# Patient Record
Sex: FEMALE | Race: WHITE | NOT HISPANIC OR LATINO | URBAN - METROPOLITAN AREA
[De-identification: names, ages, dates, MRNs, and addresses within clinical notes are randomized per-mention and may not be internally consistent; named-entity substitution may affect disease eponyms.]

---

## 2017-01-15 ENCOUNTER — HOME CARE/HOSPICE - HEALTHEAST (OUTPATIENT)
Dept: HOME HEALTH SERVICES | Facility: HOME HEALTH | Age: 82
End: 2017-01-15

## 2020-01-01 ENCOUNTER — OFFICE VISIT - HEALTHEAST (OUTPATIENT)
Dept: GERIATRICS | Facility: CLINIC | Age: 85
End: 2020-01-01

## 2020-01-01 ENCOUNTER — AMBULATORY - HEALTHEAST (OUTPATIENT)
Dept: ADMINISTRATIVE | Facility: CLINIC | Age: 85
End: 2020-01-01

## 2020-01-01 ENCOUNTER — AMBULATORY - HEALTHEAST (OUTPATIENT)
Dept: GERIATRICS | Facility: CLINIC | Age: 85
End: 2020-01-01

## 2020-01-01 ENCOUNTER — OFFICE VISIT - HEALTHEAST (OUTPATIENT)
Dept: NEUROSURGERY | Facility: CLINIC | Age: 85
End: 2020-01-01

## 2020-01-01 ENCOUNTER — COMMUNICATION - HEALTHEAST (OUTPATIENT)
Dept: GERIATRICS | Facility: CLINIC | Age: 85
End: 2020-01-01

## 2020-01-01 DIAGNOSIS — R52 PAIN MANAGEMENT: ICD-10-CM

## 2020-01-01 DIAGNOSIS — S22.051D CLOSED STABLE BURST FRACTURE OF SIXTH THORACIC VERTEBRA WITH ROUTINE HEALING, SUBSEQUENT ENCOUNTER: ICD-10-CM

## 2020-01-01 DIAGNOSIS — E43 SEVERE PROTEIN-CALORIE MALNUTRITION (H): ICD-10-CM

## 2020-01-01 DIAGNOSIS — G30.9 ALZHEIMER'S DEMENTIA WITHOUT BEHAVIORAL DISTURBANCE, UNSPECIFIED TIMING OF DEMENTIA ONSET: ICD-10-CM

## 2020-01-01 DIAGNOSIS — F02.80 ALZHEIMER'S DEMENTIA WITHOUT BEHAVIORAL DISTURBANCE, UNSPECIFIED TIMING OF DEMENTIA ONSET: ICD-10-CM

## 2020-01-01 DIAGNOSIS — M54.6 ACUTE MIDLINE THORACIC BACK PAIN: ICD-10-CM

## 2020-01-01 DIAGNOSIS — S22.000A COMPRESSION FRACTURE OF BODY OF THORACIC VERTEBRA (H): ICD-10-CM

## 2020-01-01 DIAGNOSIS — M84.48XA PATHOLOGICAL FRACTURE OF THORACIC VERTEBRA, INITIAL ENCOUNTER: ICD-10-CM

## 2020-01-01 DIAGNOSIS — S22.000A THORACIC COMPRESSION FRACTURE, CLOSED, INITIAL ENCOUNTER (H): ICD-10-CM

## 2020-01-01 DIAGNOSIS — G93.41 METABOLIC ENCEPHALOPATHY: ICD-10-CM

## 2020-01-01 RX ORDER — MIRTAZAPINE 7.5 MG/1
7.5 TABLET, FILM COATED ORAL AT BEDTIME
Status: SHIPPED | COMMUNITY
Start: 2020-01-01

## 2020-01-01 RX ORDER — ACETAMINOPHEN 325 MG/1
650 TABLET ORAL 3 TIMES DAILY
Status: SHIPPED | COMMUNITY
Start: 2020-01-01

## 2020-01-28 ENCOUNTER — AMBULATORY - HEALTHEAST (OUTPATIENT)
Dept: OTHER | Facility: CLINIC | Age: 85
End: 2020-01-28

## 2020-01-29 ENCOUNTER — COMMUNICATION - HEALTHEAST (OUTPATIENT)
Dept: NEUROSURGERY | Facility: CLINIC | Age: 85
End: 2020-01-29

## 2020-01-29 DIAGNOSIS — S22.001A CLOSED BURST FRACTURE OF THORACIC VERTEBRA, INITIAL ENCOUNTER (H): ICD-10-CM

## 2020-02-03 ENCOUNTER — OFFICE VISIT - HEALTHEAST (OUTPATIENT)
Dept: GERIATRICS | Facility: CLINIC | Age: 85
End: 2020-02-03

## 2020-02-03 DIAGNOSIS — E43 SEVERE PROTEIN-CALORIE MALNUTRITION (H): ICD-10-CM

## 2020-02-03 DIAGNOSIS — S22.061S: ICD-10-CM

## 2020-02-03 DIAGNOSIS — S22.051D CLOSED STABLE BURST FRACTURE OF SIXTH THORACIC VERTEBRA WITH ROUTINE HEALING, SUBSEQUENT ENCOUNTER: ICD-10-CM

## 2020-02-04 ENCOUNTER — RECORDS - HEALTHEAST (OUTPATIENT)
Dept: LAB | Facility: CLINIC | Age: 85
End: 2020-02-04

## 2020-02-04 LAB — 25(OH)D3 SERPL-MCNC: 43.2 NG/ML (ref 30–80)

## 2020-02-05 ENCOUNTER — OFFICE VISIT - HEALTHEAST (OUTPATIENT)
Dept: GERIATRICS | Facility: CLINIC | Age: 85
End: 2020-02-05

## 2020-02-05 DIAGNOSIS — Z71.89 ENCOUNTER FOR MEDICATION REVIEW AND COUNSELING: ICD-10-CM

## 2020-02-06 ENCOUNTER — RECORDS - HEALTHEAST (OUTPATIENT)
Dept: LAB | Facility: CLINIC | Age: 85
End: 2020-02-06

## 2020-02-06 ENCOUNTER — OFFICE VISIT - HEALTHEAST (OUTPATIENT)
Dept: GERIATRICS | Facility: CLINIC | Age: 85
End: 2020-02-06

## 2020-02-06 DIAGNOSIS — G30.9 ALZHEIMER'S DEMENTIA WITHOUT BEHAVIORAL DISTURBANCE, UNSPECIFIED TIMING OF DEMENTIA ONSET: ICD-10-CM

## 2020-02-06 DIAGNOSIS — S22.051D CLOSED STABLE BURST FRACTURE OF SIXTH THORACIC VERTEBRA WITH ROUTINE HEALING, SUBSEQUENT ENCOUNTER: ICD-10-CM

## 2020-02-06 DIAGNOSIS — E55.9 VITAMIN D INSUFFICIENCY: ICD-10-CM

## 2020-02-06 DIAGNOSIS — G47.00 INSOMNIA, UNSPECIFIED TYPE: ICD-10-CM

## 2020-02-06 DIAGNOSIS — F02.80 ALZHEIMER'S DEMENTIA WITHOUT BEHAVIORAL DISTURBANCE, UNSPECIFIED TIMING OF DEMENTIA ONSET: ICD-10-CM

## 2020-02-06 LAB
ANION GAP SERPL CALCULATED.3IONS-SCNC: 5 MMOL/L (ref 5–18)
BUN SERPL-MCNC: 11 MG/DL (ref 8–28)
CALCIUM SERPL-MCNC: 8.9 MG/DL (ref 8.5–10.5)
CHLORIDE BLD-SCNC: 100 MMOL/L (ref 98–107)
CO2 SERPL-SCNC: 33 MMOL/L (ref 22–31)
CREAT SERPL-MCNC: 0.46 MG/DL (ref 0.6–1.1)
ERYTHROCYTE [DISTWIDTH] IN BLOOD BY AUTOMATED COUNT: 14.4 % (ref 11–14.5)
GFR SERPL CREATININE-BSD FRML MDRD: >60 ML/MIN/1.73M2
GLUCOSE BLD-MCNC: 82 MG/DL (ref 70–125)
HCT VFR BLD AUTO: 35.3 % (ref 35–47)
HGB BLD-MCNC: 11.4 G/DL (ref 12–16)
MCH RBC QN AUTO: 32 PG (ref 27–34)
MCHC RBC AUTO-ENTMCNC: 32.3 G/DL (ref 32–36)
MCV RBC AUTO: 99 FL (ref 80–100)
PLATELET # BLD AUTO: 338 THOU/UL (ref 140–440)
PMV BLD AUTO: 8.8 FL (ref 8.5–12.5)
POTASSIUM BLD-SCNC: 3.9 MMOL/L (ref 3.5–5)
RBC # BLD AUTO: 3.56 MILL/UL (ref 3.8–5.4)
SODIUM SERPL-SCNC: 138 MMOL/L (ref 136–145)
WBC: 7.3 THOU/UL (ref 4–11)

## 2020-02-10 ENCOUNTER — OFFICE VISIT - HEALTHEAST (OUTPATIENT)
Dept: GERIATRICS | Facility: CLINIC | Age: 85
End: 2020-02-10

## 2020-02-10 ENCOUNTER — RECORDS - HEALTHEAST (OUTPATIENT)
Dept: LAB | Facility: CLINIC | Age: 85
End: 2020-02-10

## 2020-02-10 DIAGNOSIS — M54.6 ACUTE MIDLINE THORACIC BACK PAIN: ICD-10-CM

## 2020-02-10 DIAGNOSIS — R52 PAIN MANAGEMENT: ICD-10-CM

## 2020-02-10 DIAGNOSIS — S22.061S: ICD-10-CM

## 2020-02-10 DIAGNOSIS — E46 MALNUTRITION, UNSPECIFIED TYPE (H): ICD-10-CM

## 2020-02-10 DIAGNOSIS — S22.051D CLOSED STABLE BURST FRACTURE OF SIXTH THORACIC VERTEBRA WITH ROUTINE HEALING, SUBSEQUENT ENCOUNTER: ICD-10-CM

## 2020-02-10 LAB
ALBUMIN UR-MCNC: ABNORMAL MG/DL
AMORPH CRY #/AREA URNS HPF: ABNORMAL /[HPF]
APPEARANCE UR: ABNORMAL
BACTERIA #/AREA URNS HPF: ABNORMAL HPF
BILIRUB UR QL STRIP: NEGATIVE
COLOR UR AUTO: YELLOW
GLUCOSE UR STRIP-MCNC: NEGATIVE MG/DL
HGB UR QL STRIP: NEGATIVE
KETONES UR STRIP-MCNC: NEGATIVE MG/DL
LEUKOCYTE ESTERASE UR QL STRIP: NEGATIVE
MUCOUS THREADS #/AREA URNS LPF: ABNORMAL LPF
NITRATE UR QL: NEGATIVE
PH UR STRIP: 7.5 [PH] (ref 4.5–8)
RBC #/AREA URNS AUTO: ABNORMAL HPF
SP GR UR STRIP: 1.02 (ref 1–1.03)
SQUAMOUS #/AREA URNS AUTO: ABNORMAL LPF
UROBILINOGEN UR STRIP-ACNC: ABNORMAL
WBC #/AREA URNS AUTO: ABNORMAL HPF

## 2020-02-11 LAB — BACTERIA SPEC CULT: NO GROWTH

## 2020-02-13 ENCOUNTER — OFFICE VISIT - HEALTHEAST (OUTPATIENT)
Dept: GERIATRICS | Facility: CLINIC | Age: 85
End: 2020-02-13

## 2020-02-13 DIAGNOSIS — E55.9 VITAMIN D INSUFFICIENCY: ICD-10-CM

## 2020-02-13 DIAGNOSIS — G47.00 INSOMNIA, UNSPECIFIED TYPE: ICD-10-CM

## 2020-02-13 DIAGNOSIS — G30.9 ALZHEIMER'S DEMENTIA WITHOUT BEHAVIORAL DISTURBANCE, UNSPECIFIED TIMING OF DEMENTIA ONSET: ICD-10-CM

## 2020-02-13 DIAGNOSIS — S22.051D CLOSED STABLE BURST FRACTURE OF SIXTH THORACIC VERTEBRA WITH ROUTINE HEALING, SUBSEQUENT ENCOUNTER: ICD-10-CM

## 2020-02-13 DIAGNOSIS — F02.80 ALZHEIMER'S DEMENTIA WITHOUT BEHAVIORAL DISTURBANCE, UNSPECIFIED TIMING OF DEMENTIA ONSET: ICD-10-CM

## 2020-02-17 ENCOUNTER — HOSPITAL ENCOUNTER (OUTPATIENT)
Dept: RADIOLOGY | Facility: HOSPITAL | Age: 85
Discharge: HOME | End: 2020-02-17

## 2020-02-17 DIAGNOSIS — S22.001A CLOSED BURST FRACTURE OF THORACIC VERTEBRA, INITIAL ENCOUNTER (H): ICD-10-CM

## 2020-02-18 ENCOUNTER — OFFICE VISIT - HEALTHEAST (OUTPATIENT)
Dept: GERIATRICS | Facility: CLINIC | Age: 85
End: 2020-02-18

## 2020-02-18 DIAGNOSIS — S22.061S: ICD-10-CM

## 2020-02-18 DIAGNOSIS — M54.6 ACUTE MIDLINE THORACIC BACK PAIN: ICD-10-CM

## 2020-02-18 DIAGNOSIS — R52 PAIN MANAGEMENT: ICD-10-CM

## 2020-02-18 DIAGNOSIS — E46 MALNUTRITION, UNSPECIFIED TYPE (H): ICD-10-CM

## 2020-02-18 DIAGNOSIS — S22.051D CLOSED STABLE BURST FRACTURE OF SIXTH THORACIC VERTEBRA WITH ROUTINE HEALING, SUBSEQUENT ENCOUNTER: ICD-10-CM

## 2020-02-20 ENCOUNTER — OFFICE VISIT - HEALTHEAST (OUTPATIENT)
Dept: NEUROSURGERY | Facility: CLINIC | Age: 85
End: 2020-02-20

## 2020-02-20 DIAGNOSIS — M84.48XA PATHOLOGICAL FRACTURE OF THORACIC VERTEBRA, INITIAL ENCOUNTER: ICD-10-CM

## 2020-02-25 ENCOUNTER — AMBULATORY - HEALTHEAST (OUTPATIENT)
Dept: GERIATRICS | Facility: CLINIC | Age: 85
End: 2020-02-25

## 2020-02-25 ENCOUNTER — RECORDS - HEALTHEAST (OUTPATIENT)
Dept: GERIATRICS | Facility: CLINIC | Age: 85
End: 2020-02-25

## 2020-02-25 ENCOUNTER — OFFICE VISIT - HEALTHEAST (OUTPATIENT)
Dept: GERIATRICS | Facility: CLINIC | Age: 85
End: 2020-02-25

## 2020-02-25 DIAGNOSIS — S22.051D CLOSED STABLE BURST FRACTURE OF SIXTH THORACIC VERTEBRA WITH ROUTINE HEALING, SUBSEQUENT ENCOUNTER: ICD-10-CM

## 2020-02-25 DIAGNOSIS — S22.061S: ICD-10-CM

## 2020-02-25 DIAGNOSIS — R64 CACHEXIA (H): ICD-10-CM

## 2020-02-25 DIAGNOSIS — R52 PAIN MANAGEMENT: ICD-10-CM

## 2020-02-25 DIAGNOSIS — M54.6 ACUTE MIDLINE THORACIC BACK PAIN: ICD-10-CM

## 2020-02-25 RX ORDER — GABAPENTIN 100 MG/1
100 CAPSULE ORAL 3 TIMES DAILY
Status: SHIPPED | COMMUNITY
Start: 2020-02-25

## 2020-02-27 ENCOUNTER — COMMUNICATION - HEALTHEAST (OUTPATIENT)
Dept: GERIATRICS | Facility: CLINIC | Age: 85
End: 2020-02-27

## 2020-02-28 ENCOUNTER — OFFICE VISIT - HEALTHEAST (OUTPATIENT)
Dept: GERIATRICS | Facility: CLINIC | Age: 85
End: 2020-02-28

## 2020-02-28 DIAGNOSIS — G30.9 ALZHEIMER'S DEMENTIA WITHOUT BEHAVIORAL DISTURBANCE, UNSPECIFIED TIMING OF DEMENTIA ONSET: ICD-10-CM

## 2020-02-28 DIAGNOSIS — S22.000A COMPRESSION FRACTURE OF BODY OF THORACIC VERTEBRA (H): ICD-10-CM

## 2020-02-28 DIAGNOSIS — F02.80 ALZHEIMER'S DEMENTIA WITHOUT BEHAVIORAL DISTURBANCE, UNSPECIFIED TIMING OF DEMENTIA ONSET: ICD-10-CM

## 2020-02-28 DIAGNOSIS — M54.6 ACUTE MIDLINE THORACIC BACK PAIN: ICD-10-CM

## 2020-03-06 ENCOUNTER — OFFICE VISIT - HEALTHEAST (OUTPATIENT)
Dept: GERIATRICS | Facility: CLINIC | Age: 85
End: 2020-03-06

## 2020-03-06 DIAGNOSIS — S22.000A COMPRESSION FRACTURE OF BODY OF THORACIC VERTEBRA (H): ICD-10-CM

## 2021-01-01 ENCOUNTER — COMMUNICATION - HEALTHEAST (OUTPATIENT)
Dept: GERIATRICS | Facility: CLINIC | Age: 86
End: 2021-01-01

## 2021-01-01 ENCOUNTER — OFFICE VISIT - HEALTHEAST (OUTPATIENT)
Dept: GERIATRICS | Facility: CLINIC | Age: 86
End: 2021-01-01

## 2021-01-01 ENCOUNTER — COMMUNICATION - HEALTHEAST (OUTPATIENT)
Dept: CARE COORDINATION | Facility: HOSPITAL | Age: 86
End: 2021-01-01

## 2021-01-01 ENCOUNTER — AMBULATORY - HEALTHEAST (OUTPATIENT)
Dept: ADMINISTRATIVE | Facility: CLINIC | Age: 86
End: 2021-01-01

## 2021-01-01 DIAGNOSIS — M54.6 ACUTE MIDLINE THORACIC BACK PAIN: ICD-10-CM

## 2021-01-01 DIAGNOSIS — R06.02 SOB (SHORTNESS OF BREATH): ICD-10-CM

## 2021-01-01 DIAGNOSIS — G93.41 METABOLIC ENCEPHALOPATHY: ICD-10-CM

## 2021-01-01 DIAGNOSIS — G30.9 ALZHEIMER'S DEMENTIA WITHOUT BEHAVIORAL DISTURBANCE, UNSPECIFIED TIMING OF DEMENTIA ONSET: ICD-10-CM

## 2021-01-01 DIAGNOSIS — S22.000A THORACIC COMPRESSION FRACTURE, CLOSED, INITIAL ENCOUNTER (H): ICD-10-CM

## 2021-01-01 DIAGNOSIS — R19.7 DIARRHEA, UNSPECIFIED TYPE: ICD-10-CM

## 2021-01-01 DIAGNOSIS — E43 SEVERE PROTEIN-CALORIE MALNUTRITION (H): ICD-10-CM

## 2021-01-01 DIAGNOSIS — S22.000A COMPRESSION FRACTURE OF BODY OF THORACIC VERTEBRA (H): ICD-10-CM

## 2021-01-01 DIAGNOSIS — F02.80 ALZHEIMER'S DEMENTIA WITHOUT BEHAVIORAL DISTURBANCE, UNSPECIFIED TIMING OF DEMENTIA ONSET: ICD-10-CM

## 2021-01-01 RX ORDER — MORPHINE SULFATE 30 MG/1
2.5 TABLET ORAL
Status: SHIPPED | COMMUNITY
Start: 2021-01-01

## 2021-01-01 RX ORDER — LIDOCAINE 50 MG/G
1 PATCH TOPICAL EVERY 24 HOURS
Status: SHIPPED | COMMUNITY
Start: 2021-01-01

## 2021-01-01 ASSESSMENT — MIFFLIN-ST. JEOR
SCORE: 789.17
SCORE: 784.63

## 2021-03-12 ENCOUNTER — COMMUNICATION - HEALTHEAST (OUTPATIENT)
Dept: GERIATRICS | Facility: CLINIC | Age: 86
End: 2021-03-12

## 2021-05-27 VITALS
DIASTOLIC BLOOD PRESSURE: 62 MMHG | SYSTOLIC BLOOD PRESSURE: 120 MMHG | OXYGEN SATURATION: 91 % | HEART RATE: 93 BPM | RESPIRATION RATE: 22 BRPM | TEMPERATURE: 98.2 F

## 2021-05-27 VITALS
SYSTOLIC BLOOD PRESSURE: 115 MMHG | OXYGEN SATURATION: 92 % | HEART RATE: 100 BPM | DIASTOLIC BLOOD PRESSURE: 74 MMHG | RESPIRATION RATE: 18 BRPM

## 2021-06-04 VITALS
HEART RATE: 87 BPM | TEMPERATURE: 97.8 F | DIASTOLIC BLOOD PRESSURE: 66 MMHG | SYSTOLIC BLOOD PRESSURE: 95 MMHG | BODY MASS INDEX: 13.53 KG/M2 | WEIGHT: 78.8 LBS

## 2021-06-04 VITALS
BODY MASS INDEX: 14.66 KG/M2 | DIASTOLIC BLOOD PRESSURE: 67 MMHG | SYSTOLIC BLOOD PRESSURE: 121 MMHG | OXYGEN SATURATION: 92 % | WEIGHT: 85.4 LBS | TEMPERATURE: 98.3 F | HEART RATE: 88 BPM | RESPIRATION RATE: 20 BRPM

## 2021-06-04 VITALS
HEART RATE: 82 BPM | DIASTOLIC BLOOD PRESSURE: 70 MMHG | RESPIRATION RATE: 16 BRPM | SYSTOLIC BLOOD PRESSURE: 129 MMHG | WEIGHT: 88.4 LBS | BODY MASS INDEX: 15.17 KG/M2 | OXYGEN SATURATION: 92 % | TEMPERATURE: 96.5 F

## 2021-06-04 VITALS
WEIGHT: 88.4 LBS | HEART RATE: 99 BPM | SYSTOLIC BLOOD PRESSURE: 154 MMHG | BODY MASS INDEX: 15.17 KG/M2 | RESPIRATION RATE: 18 BRPM | OXYGEN SATURATION: 94 % | TEMPERATURE: 97.4 F | DIASTOLIC BLOOD PRESSURE: 93 MMHG

## 2021-06-04 VITALS
HEART RATE: 87 BPM | BODY MASS INDEX: 15.17 KG/M2 | OXYGEN SATURATION: 90 % | TEMPERATURE: 98.5 F | DIASTOLIC BLOOD PRESSURE: 92 MMHG | SYSTOLIC BLOOD PRESSURE: 149 MMHG | RESPIRATION RATE: 18 BRPM | WEIGHT: 88.4 LBS

## 2021-06-05 VITALS
TEMPERATURE: 97.3 F | OXYGEN SATURATION: 91 % | DIASTOLIC BLOOD PRESSURE: 81 MMHG | HEIGHT: 64 IN | SYSTOLIC BLOOD PRESSURE: 110 MMHG | BODY MASS INDEX: 14.85 KG/M2 | WEIGHT: 87 LBS | RESPIRATION RATE: 20 BRPM | HEART RATE: 85 BPM

## 2021-06-05 VITALS
HEART RATE: 88 BPM | SYSTOLIC BLOOD PRESSURE: 105 MMHG | TEMPERATURE: 98 F | DIASTOLIC BLOOD PRESSURE: 57 MMHG | WEIGHT: 88 LBS | BODY MASS INDEX: 15.11 KG/M2

## 2021-06-05 VITALS
SYSTOLIC BLOOD PRESSURE: 110 MMHG | BODY MASS INDEX: 15.03 KG/M2 | DIASTOLIC BLOOD PRESSURE: 81 MMHG | HEART RATE: 83 BPM | HEIGHT: 64 IN | TEMPERATURE: 98 F | OXYGEN SATURATION: 90 % | WEIGHT: 88 LBS | RESPIRATION RATE: 20 BRPM

## 2021-06-05 VITALS
HEART RATE: 109 BPM | OXYGEN SATURATION: 90 % | DIASTOLIC BLOOD PRESSURE: 42 MMHG | SYSTOLIC BLOOD PRESSURE: 64 MMHG | WEIGHT: 87.8 LBS | RESPIRATION RATE: 18 BRPM | TEMPERATURE: 99 F | BODY MASS INDEX: 15.07 KG/M2

## 2021-06-05 NOTE — PROGRESS NOTES
Patient was seen at [place] [address] for the fitting and delivery of a TLSO.  Patient presents with 3 family member and all were educated on the fit and function of the TLSO.  Patient refused custom and [place] TLSO options quickly.  [employer] TLSO was donned and was fit to the patient.  TLSO was left in the room to be worn when HOB above 30 degrees, sitting, standing, and walking.

## 2021-06-05 NOTE — TELEPHONE ENCOUNTER
PATIENT NAME:  Ade Bob  YOB: 1927  MRN: [id number]  SURGEON: Dr Rodgers  DATE of CONSULT: 1/28/2020  DIAGNOSIS: T6 and T8 burst fractures, no known trauma - no surgery    FOLLOW-UP:  Follow up Visit:  2 wks with xrays in neurosurgery clinic with JOSE   DIAGNOSTICS:standing thoracic xr in brace  DISPOSITION:  Likely TCU

## 2021-06-05 NOTE — PROGRESS NOTES
[facility]      Facility:    [facility] [[id number]]  Code Status: DNR       Chief Complaint/Reason for Visit:  Chief Complaint   Patient presents with     H & P     Admit note to TCU for back pain, thoracic compression fractures.         HPI:   Ade is a 89 y.o. female with hx of dementia, prior compression fractures, presented to the hospital on 1/28/20 with back pain.     From Hospital HPI:  Ade Bob is a 89 y.o. old female with h/o pertinent history of hyperlipdemia, dementia, back pain, alzheimer's disease, and malnutrition who presents to this ED via [facility] with family for evaluation of back pain.  Family present and provides information, her back pain is in the middle of her back, constant, and unchanging. Per family, patient lives at lone but family frequently visits to check on her. No one has noticed any recent fall or trauma and patient denies, but she has dementia.  Patient was in the hospital 3 years ago for fractured T10-L3 vertebrae, but has otherwise been healthy. Shes had severe back pain.     From Hospital DC Summary:  Ade Bob is a 89 y.o. F who lives independently, with PMH of HLD, dementia, back pain, alzheimer's disease, underweight, malnutrition, T10, T12, L3, [id number] fractures for which she was hospitalized 3 years ago, who presents to this ED via [facility] with family for evaluation of back pain.   She was diagnosed with acute superior and inferior endplate compression fractures of T6 with 50% vertebral body height loss, minimal retropulsion into spinal canal and old T8 compression fractures. She was seen by neurosurgery and a [employer] TLSO brace was placed 1/29/2020.  Pain management with scheduled Tylenol, topical lidocaine and low-dose, 2.5 mg every 4 hours as needed oxycodone.  MiraLAX/senna for opioid-induced constipation prophylaxis.  PT/OT evaluated, recommended TCU.      Vitamin D  insufficiency, level low at 22.4 on 17.  No most recent vitamin D level recheck.  On Fosamax.  Added vitamin D and multivitamins.  Check vitamin D level as outpatient.     Primary insomnia-started low-dose 5 mg trazodone.     Moderate protein calorie malnutrition, failure to thrive in adult, underweight, Body mass index is 14.23 kg/m .  Patient was seen by RD.  Commended multivitamins, Magic cup twice a day with meals.     Overall stabilized and discharged to TCU on 20 for PT, OT, nursing cares, medical management and monitoring.      Past Medical History:  Past Medical History:   Diagnosis Date     Alzheimer's dementia     per family report     Back pain 2017     Dementia      Hyperlipidemia            Surgical History:  Past Surgical History:   Procedure Laterality Date     NO PAST SURGERIES         Family History:   Unable to recall secondary to dementia.       Social History:    Social History     Socioeconomic History     Marital status: Single     Spouse name: Not on file     Number of children: Not on file     Years of education: Not on file     Highest education level: Not on file   Occupational History     Not on file   Social Needs     Financial resource strain: Not on file     Food insecurity:     Worry: Not on file     Inability: Not on file     Transportation needs:     Medical: Not on file     Non-medical: Not on file   Tobacco Use     Smoking status: Former Smoker     Packs/day: 1.00     Last attempt to quit: 1977     Years since quittin.1     Smokeless tobacco: Never Used     Tobacco comment: Quit smoking many years ago   Substance and Sexual Activity     Alcohol use: No     Drug use: No     Sexual activity: Not Currently   Lifestyle     Physical activity:     Days per week: Not on file     Minutes per session: Not on file     Stress: Not on file   Relationships     Social connections:     Talks on phone: Not on file     Gets together: Not on file     Attends Congregation  service: Not on file     Active member of club or organization: Not on file     Attends meetings of clubs or organizations: Not on file     Relationship status: Not on file     Intimate partner violence:     Fear of current or ex partner: Not on file     Emotionally abused: Not on file     Physically abused: Not on file     Forced sexual activity: Not on file   Other Topics Concern     Not on file   Social History Narrative     Not on file       Medications:  Current Outpatient Medications   Medication Sig     acetaminophen (TYLENOL) 325 MG tablet Take 2 tablets (650 mg total) by mouth 3 (three) times a day.     alendronate (FOSAMAX) 70 MG tablet Take 70 mg by mouth once a week. Saturdays     aluminum-magnesium hydroxide-simethicone (MAALOX ADVANCED) 200-200-20 mg/5 mL Susp Take 30 mL by mouth every 4 (four) hours as needed.     ergocalciferol (ERGOCALCIFEROL) 1,250 mcg (50,000 unit) capsule Take 1 capsule (50,000 Units total) by mouth once a week.     lidocaine (XYLOCAINE) 5 % ointment Apply to mid back tid     multivitamin therapeutic tablet Take 1 tablet by mouth daily.     oxyCODONE (ROXICODONE) 5 MG immediate release tablet Take 0.5-1 tablets (2.5-5 mg total) by mouth every 4 (four) hours as needed.     polyethylene glycol (MIRALAX) 17 gram packet Take 1 packet (17 g total) by mouth daily.     senna-docusate (PERICOLACE) 8.6-50 mg tablet Take 1 tablet by mouth 2 (two) times a day.     traZODone (DESYREL) 50 MG tablet Take 0.5 tablets (25 mg total) by mouth at bedtime.       Allergies:  Allergies   Allergen Reactions     Cefdinir Nausea Only     Sulfa (Sulfonamide Antibiotics) Unknown and Hives        Review of Systems:  Pertinent items are noted in HPI.      Physical Exam:   General: Patient is alert elderly female, no distress. Thin and frail.   Vitals: /71, Temp 97.8, Pulse 90, RR 18, O2 sat 92%RA.  HEENT: Head is NCAT. Eyes show no injection or icterus. Nares negative. Oropharynx well hydrated.  Neck:  Supple. No tenderness or adenopathy. No JVD.  Lungs: Clear bilaterally. No wheezes.  Cardiovascular: Regular rate and rhythm, normal S1. S2.  Back: No spinal or CVA tenderness.  Abdomen: Soft, no tenderness on exam. Bowel sounds present. No guarding rebound or rigidity.  : Deferred.  Extremities: No edema is noted.  Musculoskeletal: Age related degen changes.   Skin: Warm and dry.   Psych: Mood appears good though she is tired.      Labs:  Lab Results   Component Value Date    WBC 7.3 02/06/2020    HGB 11.4 (L) 02/06/2020    HCT 35.3 02/06/2020    MCV 99 02/06/2020     02/06/2020     Results for orders placed or performed in visit on 02/06/20   Basic Metabolic Panel   Result Value Ref Range    Sodium 138 136 - 145 mmol/L    Potassium 3.9 3.5 - 5.0 mmol/L    Chloride 100 98 - 107 mmol/L    CO2 33 (H) 22 - 31 mmol/L    Anion Gap, Calculation 5 5 - 18 mmol/L    Glucose 82 70 - 125 mg/dL    Calcium 8.9 8.5 - 10.5 mg/dL    BUN 11 8 - 28 mg/dL    Creatinine 0.46 (L) 0.60 - 1.10 mg/dL    GFR MDRD Af Amer >60 >60 mL/min/1.73m2    GFR MDRD Non Af Amer >60 >60 mL/min/1.73m2         Assessment/Plan:  1. Acute on chronic back pain. Acute T6 fx with old stable fxs T8, T10, L3, L4. Has TLSO. On tylenol with low dose prn oxycodone.  2. Alzheimer dementia. Lives at home alone with help from family.  involved, family reportedly now considering LTC which is likely recommended.  3. Vitamin D insufficieny. Started on replacement.  4. Insomnia. Sleeping well with trazodone.  5. Malnutrition. Dietary to assess. On multivitamin.  6. Osteoporosis. On Alendronate.  7. Code status is DNR.       Total time greater than 40 minutes, greater than 50% counseling and coordination of care, time spent in interview and examination of patient, discussion with nursing staff. CC time includes time with patient and family to review situation, disease states with old and new fractures, dementia and malnutrition, not recommended to  live alone despite much support from family, she is alone overnight and family now considering placement. Review of management for pain and fractures, follow up if needed with spine.         Electronically signed by: Flory Stubbs MD

## 2021-06-05 NOTE — PROGRESS NOTES
[facility]    Facility:   [facility] [[id number]]   Code Status: DNR      CHIEF COMPLAINT/REASON FOR VISIT:  Chief Complaint   Patient presents with     Problem Visit       HISTORY:      HPI: Ade is a 89 y.o. female undergoing physical and occupational therapy at [facility]. She is  with PMH of HLD, dementia, back pain, alzheimer's disease, underweight, malnutrition, T10, T12, L3, [id number] fractures for which she was hospitalized 3 years ago, who presents to the ED via [facility] with family for evaluation of back pain.   She was diagnosed with acute superior and inferior endplate compression fractures of T6 with 50% vertebral body height loss, minimal retropulsion into spinal canal and old T8 compression fractures. She was seen by neurosurgery and a [employer] TLSO brace was placed 1/29/2020.        Today she is seen for review of medication.  Currently she is on 50,000 weekly  of vitamin D .  A level was checked and she was within normal range at 43.2.  She was switched to a maintenance dose of 2000 international units daily.  Patient did report feeling dizzy and weak and labs to be checked in the a.m. her last blood work was done on 128 and was within normal limits.  It appears today she did not eat much of her breakfast at all she normally needs cues that remind her that she needs to eat.   She denied chest pain or shortness of breath.  She denied cough or congestion.  She reports she is moving her bowels.    It appears she will need long-term care placement when she discharges.    She is wearing a TLSO brace.  She was oriented to self.  She denied pain .    Past Medical History:   Diagnosis Date     Alzheimer's dementia     per family report     Back pain 01/14/2017     Dementia      Hyperlipidemia              No family history on file.  Social History     Socioeconomic History     Marital status: Single     Spouse name: Not on  file     Number of children: Not on file     Years of education: Not on file     Highest education level: Not on file   Occupational History     Not on file   Social Needs     Financial resource strain: Not on file     Food insecurity:     Worry: Not on file     Inability: Not on file     Transportation needs:     Medical: Not on file     Non-medical: Not on file   Tobacco Use     Smoking status: Former Smoker     Packs/day: 1.00     Last attempt to quit: 1977     Years since quittin.1     Smokeless tobacco: Never Used     Tobacco comment: Quit smoking many years ago   Substance and Sexual Activity     Alcohol use: No     Drug use: No     Sexual activity: Not Currently   Lifestyle     Physical activity:     Days per week: Not on file     Minutes per session: Not on file     Stress: Not on file   Relationships     Social connections:     Talks on phone: Not on file     Gets together: Not on file     Attends Worship service: Not on file     Active member of club or organization: Not on file     Attends meetings of clubs or organizations: Not on file     Relationship status: Not on file     Intimate partner violence:     Fear of current or ex partner: Not on file     Emotionally abused: Not on file     Physically abused: Not on file     Forced sexual activity: Not on file   Other Topics Concern     Not on file   Social History Narrative     Not on file         Review of Systems   Constitutional: Positive for activity change. Negative for appetite change, fatigue and fever.   HENT: Negative.  Negative for congestion.    Eyes: Negative.    Respiratory: Negative.  Negative for cough, shortness of breath and wheezing.    Cardiovascular: Negative.  Negative for chest pain and leg swelling.   Gastrointestinal: Negative.  Negative for abdominal distention, abdominal pain, constipation, diarrhea and nausea.   Endocrine: Negative.    Genitourinary: Negative.  Negative for dysuria.   Musculoskeletal: Positive for back  pain. Negative for arthralgias.   Skin: Negative.  Negative for color change and wound.   Neurological: Negative.  Negative for dizziness.   Hematological: Negative.    Psychiatric/Behavioral: Negative.  Negative for agitation, behavioral problems and confusion.       Vitals:    02/05/20 0830   BP: (!) 149/92   Pulse: 87   Resp: 18   Temp: 98.5  F (36.9  C)   SpO2: 90%   Weight: (!) 88 lb 6.4 oz (40.1 kg)       Physical Exam  Constitutional:       Appearance: She is well-developed.   HENT:      Head: Normocephalic.   Eyes:      Conjunctiva/sclera: Conjunctivae normal.   Neck:      Musculoskeletal: Normal range of motion.   Cardiovascular:      Rate and Rhythm: Normal rate and regular rhythm.      Heart sounds: Normal heart sounds. No murmur.   Pulmonary:      Effort: No respiratory distress.      Breath sounds: Normal breath sounds. No wheezing or rales.   Abdominal:      General: Bowel sounds are normal. There is no distension.      Palpations: Abdomen is soft.      Tenderness: There is no abdominal tenderness.   Musculoskeletal: Normal range of motion.      Comments: Patient wearing a TLSO brace   Skin:     General: Skin is warm.   Neurological:      Mental Status: She is alert.      Comments: Alert and oriented to self   Psychiatric:         Behavior: Behavior normal.           LABS:   Recent Results (from the past 240 hour(s))   Basic Metabolic Panel   Result Value Ref Range    Sodium 141 136 - 145 mmol/L    Potassium 4.2 3.5 - 5.0 mmol/L    Chloride 103 98 - 107 mmol/L    CO2 22 22 - 31 mmol/L    Anion Gap, Calculation 16 5 - 18 mmol/L    Glucose 102 70 - 125 mg/dL    Calcium 10.0 8.5 - 10.5 mg/dL    BUN 19 8 - 28 mg/dL    Creatinine 0.64 0.60 - 1.10 mg/dL    GFR MDRD Af Amer >60 >60 mL/min/1.73m2    GFR MDRD Non Af Amer >60 >60 mL/min/1.73m2   HM2 (CBC W/O DIFF)   Result Value Ref Range    WBC 8.1 4.0 - 11.0 thou/uL    RBC 4.40 3.80 - 5.40 mill/uL    Hemoglobin 14.0 12.0 - 16.0 g/dL    Hematocrit 43.7 35.0 -  47.0 %    MCV 99 80 - 100 fL    MCH 31.8 27.0 - 34.0 pg    MCHC 32.0 32.0 - 36.0 g/dL    RDW 13.6 11.0 - 14.5 %    Platelets 371 140 - 440 thou/uL    MPV 8.7 8.5 - 12.5 fL   C-Reactive Protein   Result Value Ref Range    CRP 8.3 (H) 0.0 - 0.8 mg/dL   Vitamin D, Total (25-Hydroxy)   Result Value Ref Range    Vitamin D, Total (25-Hydroxy) 43.2 30.0 - 80.0 ng/mL       ASSESSMENT:      ICD-10-CM    1. Encounter for medication review and counseling Z71.89        PLAN:    T6, T8 burst fracture PT/OT, on Alendronate weekly pain control    Malnutrition dietary consult , daily weights     Vit d deficiency-50,000 weekly dc'd and pt started on 2000 IU daily recheck lab in one month , Last Vit D 43.2 on 2/5/20    Insomnia On Trazadone    Pain management Tylenol 650 MG TID    Electronically signed by: Ofelia Ballesteros CNP

## 2021-06-05 NOTE — PROGRESS NOTES
[facility]    Facility:   [facility] [[id number]]   Code Status: DNR      CHIEF COMPLAINT/REASON FOR VISIT:  Chief Complaint   Patient presents with     Review Of Multiple Medical Conditions       HISTORY:      HPI: Ade is a 89 y.o. female undergoing physical and occupational therapy at [facility]. She is  with PMH of HLD, dementia, back pain, alzheimer's disease, underweight, malnutrition, T10, T12, L3, [id number] fractures for which she was hospitalized 3 years ago, who presents to the ED via [facility] with family for evaluation of back pain.   She was diagnosed with acute superior and inferior endplate compression fractures of T6 with 50% vertebral body height loss, minimal retropulsion into spinal canal and old T8 compression fractures. She was seen by neurosurgery and a [employer] [place] [employer] TLSO brace was placed 1/29/2020.        Today she is seen for a first routine visit to review multiple medical issues.  Her son and daughter were at the bedside.  She denied chest pain or shortness of breath.  She denied cough or congestion.  She reports she is moving her bowels.  She does refuse to eat and per the family they go to her house 3 times a day to monitor her eating.  She did however eat 75% of her breakfast with cues from her daughter.  Her dietary consult was placed.  It appears she will need long-term care placement when she discharges.  She currently is on 50,000 units of vitamin D however last level found in her chart was from 2017 a vitamin D level will be checked in the a.m. and her medication adjusted as appropriate.  She is wearing a TLSO brace.  She was oriented to self.  She denied pain however her daughter reports that she will scream in pain when she  needs to move.    Past Medical History:   Diagnosis Date     Alzheimer's dementia     per family report     Back pain 01/14/2017     Dementia      Hyperlipidemia              No  family history on file.  Social History     Socioeconomic History     Marital status: Single     Spouse name: Not on file     Number of children: Not on file     Years of education: Not on file     Highest education level: Not on file   Occupational History     Not on file   Social Needs     Financial resource strain: Not on file     Food insecurity:     Worry: Not on file     Inability: Not on file     Transportation needs:     Medical: Not on file     Non-medical: Not on file   Tobacco Use     Smoking status: Former Smoker     Packs/day: 1.00     Last attempt to quit: 1977     Years since quittin.1     Smokeless tobacco: Never Used     Tobacco comment: Quit smoking many years ago   Substance and Sexual Activity     Alcohol use: No     Drug use: No     Sexual activity: Not Currently   Lifestyle     Physical activity:     Days per week: Not on file     Minutes per session: Not on file     Stress: Not on file   Relationships     Social connections:     Talks on phone: Not on file     Gets together: Not on file     Attends Voodoo service: Not on file     Active member of club or organization: Not on file     Attends meetings of clubs or organizations: Not on file     Relationship status: Not on file     Intimate partner violence:     Fear of current or ex partner: Not on file     Emotionally abused: Not on file     Physically abused: Not on file     Forced sexual activity: Not on file   Other Topics Concern     Not on file   Social History Narrative     Not on file         Review of Systems   Constitutional: Positive for activity change. Negative for appetite change, fatigue and fever.   HENT: Negative.  Negative for congestion.    Eyes: Negative.    Respiratory: Negative.  Negative for cough, shortness of breath and wheezing.    Cardiovascular: Negative.  Negative for chest pain and leg swelling.   Gastrointestinal: Negative.  Negative for abdominal distention, abdominal pain, constipation, diarrhea and  nausea.   Endocrine: Negative.    Genitourinary: Negative.  Negative for dysuria.   Musculoskeletal: Positive for back pain. Negative for arthralgias.   Skin: Negative.  Negative for color change and wound.   Neurological: Negative.  Negative for dizziness.   Hematological: Negative.    Psychiatric/Behavioral: Negative.  Negative for agitation, behavioral problems and confusion.       Vitals:    02/03/20 0823   BP: 120/62   Pulse: 93   Resp: 22   Temp: 98.2  F (36.8  C)   SpO2: 91%       Physical Exam  Constitutional:       Appearance: She is well-developed.   HENT:      Head: Normocephalic.   Eyes:      Conjunctiva/sclera: Conjunctivae normal.   Neck:      Musculoskeletal: Normal range of motion.   Cardiovascular:      Rate and Rhythm: Normal rate and regular rhythm.      Heart sounds: Normal heart sounds. No murmur.   Pulmonary:      Effort: No respiratory distress.      Breath sounds: Normal breath sounds. No wheezing or rales.   Abdominal:      General: Bowel sounds are normal. There is no distension.      Palpations: Abdomen is soft.      Tenderness: There is no abdominal tenderness.   Musculoskeletal: Normal range of motion.      Comments: Patient wearing a TLSO brace   Skin:     General: Skin is warm.   Neurological:      Mental Status: She is alert.      Comments: Alert and oriented to self   Psychiatric:         Behavior: Behavior normal.           LABS:   Recent Results (from the past 240 hour(s))   Basic Metabolic Panel   Result Value Ref Range    Sodium 141 136 - 145 mmol/L    Potassium 4.2 3.5 - 5.0 mmol/L    Chloride 103 98 - 107 mmol/L    CO2 22 22 - 31 mmol/L    Anion Gap, Calculation 16 5 - 18 mmol/L    Glucose 102 70 - 125 mg/dL    Calcium 10.0 8.5 - 10.5 mg/dL    BUN 19 8 - 28 mg/dL    Creatinine 0.64 0.60 - 1.10 mg/dL    GFR MDRD Af Amer >60 >60 mL/min/1.73m2    GFR MDRD Non Af Amer >60 >60 mL/min/1.73m2   HM2 (CBC W/O DIFF)   Result Value Ref Range    WBC 8.1 4.0 - 11.0 thou/uL    RBC 4.40 3.80  - 5.40 mill/uL    Hemoglobin 14.0 12.0 - 16.0 g/dL    Hematocrit 43.7 35.0 - 47.0 %    MCV 99 80 - 100 fL    MCH 31.8 27.0 - 34.0 pg    MCHC 32.0 32.0 - 36.0 g/dL    RDW 13.6 11.0 - 14.5 %    Platelets 371 140 - 440 thou/uL    MPV 8.7 8.5 - 12.5 fL   C-Reactive Protein   Result Value Ref Range    CRP 8.3 (H) 0.0 - 0.8 mg/dL       ASSESSMENT:      ICD-10-CM    1. Closed stable burst fracture of sixth thoracic vertebra with routine healing, subsequent encounter S22.051D    2. Closed stable burst fracture of eighth thoracic vertebra, sequela S22.061S    3. Severe protein-calorie malnutrition (H) E43        PLAN:    T6, T8 burst fracture PT/OT, on Alendronate weekly    Malnutrition dietary consult , daily weights     Vit d deficiency-50,000 weekly, Last Vit D     Insomnia On Trazadone    Pain management Tylenol 650 MG TID    Electronically signed by: Ofelia Ballesteros, CNP

## 2021-06-06 NOTE — PATIENT INSTRUCTIONS - HE
You were seen today in follow up of your thoracic spinal fractures. Your Xray is stable. At this time we would recommend continuing the brace. Would recommend follow up in 4-6 weeks with repeat Xray. Could consider vertebroplasty if unable to control pain. Would anticipate this beginning to improve with time.     WEARING THE LUMBAR BRACE:    * Wear the brace when out of bed or sitting upright in bed.  * You should apply the brace before standing.  * You may shower seated without brace.   Sit down on shower chair, remove brace   Shower   Dry   Re-apply brace before standing.   Take care not to fall  *If your brace is not fitting call [facility] [phone]  *Check  your incision and the skin under your brace at least daily.    Call [phone] with the following symptoms:    *Worsening pain not relieved by the pain prescription given  *Worsening or new onset of weakness, or numbness and tingling  *Loss or change in your ability to control bowel or bladder function  *Change in your ability to walk, talk, see or think  *If you did not have a bowel movement before leaving the hospital and your bowels have not moved within 48 hours of your discharge    !!!! IF YOU HAVE A SERIOUS OR THREATENING EMERGENCY, CALL 911 OR COME TO THE EMERGENCY ROOM.  IF YOUR CONDITIONS ALLOWS COME TO THE HOSPITAL WHERE YOUR SURGERY WAS PERFORMED.    QUESTIONS OR CONCERNS:   OUR OFFICE HOURS ARE FROM 9:00 A.M -4:30 P.M. MONDAY-FRIDAY.  AFTER OFFICE HOURS, CALLS ARE ANSWERED BY THE ON-CALL PROVIDER. PLEASE CALL WITH ROUTINE QUESTIONS DURING OFFICE HOURS. THE ON-CALL PROVIDER WILL NOT REFILL PRESCRIPTIONS.

## 2021-06-06 NOTE — PROGRESS NOTES
[facility]    Facility:   [facility] [[id number]]   Code Status: DNR  PCP: Ap García MD   Phone: [phone]   Fax: [phone]      CHIEF COMPLAINT/REASON FOR VISIT:  Chief Complaint   Patient presents with     Discharge Summary       HISTORY COURSE:  Ade is a 89 y.o. female undergoing physical and occupational therapy at [facility]. She is  with PMH of HLD, dementia, back pain, alzheimer's disease, underweight, malnutrition, T10, T12, L3, [id number] fractures for which she was hospitalized 3 years ago, who presents to the ED via [facility] with family for evaluation of back pain.   She was diagnosed with acute superior and inferior endplate compression fractures of T6 with 50% vertebral body height loss, minimal retropulsion into spinal canal and old T8 compression fractures. She was seen by neurosurgery and a [employer] Horizon TLSO brace was placed 1/29/2020.          Today she is seen for a face to face for discharge. She will discharge to [facility] today 2/25/20 with current medications and treatments.   She does wear a  TLSO brace when out of bed.  It appears she will need it for 12 weeks. She is alert to name only.  She reported her pain is controlled.   She denied chest pain or shortness of breath.  She denied cough or congestion.  She reports she is moving her bowels.          Review of Systems  Constitutional: Positive for activity change. Negative for appetite change, fatigue and fever.   HENT: Negative.  Negative for congestion.    Eyes: Negative.    Respiratory: Negative.  Negative for cough, shortness of breath and wheezing.    Cardiovascular: Negative.  Negative for chest pain and leg swelling.   Gastrointestinal: Negative.  Negative for abdominal distention, abdominal pain, constipation, diarrhea and nausea.   Endocrine: Negative.    Genitourinary: Negative.  Negative for dysuria.    Musculoskeletal: Positive for back pain. Negative for arthralgias.   Skin: Negative.  Negative for color change and wound.   Neurological: Negative.  Negative for dizziness.   Hematological: Negative.    Psychiatric/Behavioral: Negative.  Negative for agitation, behavioral problems and confusion.      Vitals:    02/25/20 0907   BP: 121/67   Pulse: 88   Resp: 20   Temp: 98.3  F (36.8  C)   SpO2: 92%   Weight: (!) 85 lb 6.4 oz (38.7 kg)       Physical Exam  Constitutional:       Appearance: She is well-developed.   HENT:      Head: Normocephalic.   Eyes:      Conjunctiva/sclera: Conjunctivae normal.   Neck:      Musculoskeletal: Normal range of motion.   Cardiovascular:      Rate and Rhythm: Normal rate and regular rhythm.      Heart sounds: Normal heart sounds. No murmur.   Pulmonary:      Effort: No respiratory distress.      Breath sounds: Normal breath sounds. No wheezing or rales.   Abdominal:      General: Bowel sounds are normal. There is no distension.      Palpations: Abdomen is soft.      Tenderness: There is no abdominal tenderness.   Musculoskeletal: Normal range of motion.      Comments: Patient wearing a TLSO brace   Skin:     General: Skin is warm.   Neurological:      Mental Status: She is alert.      Comments: Alert and oriented to self   Psychiatric:         Behavior: Behavior normal.     MEDICATION LIST:  Current Outpatient Medications   Medication Sig     acetaminophen (TYLENOL) 325 MG tablet Take 2 tablets (650 mg total) by mouth 3 (three) times a day.     alendronate (FOSAMAX) 70 MG tablet Take 70 mg by mouth once a week. Saturdays     aluminum-magnesium hydroxide-simethicone (MAALOX ADVANCED) 200-200-20 mg/5 mL Susp Take 30 mL by mouth every 4 (four) hours as needed.     cholecalciferol, vitamin D3, 1,000 unit (25 mcg) tablet Take 2,000 Units by mouth daily.     gabapentin (NEURONTIN) 100 MG capsule Take 100 mg by mouth 3 (three) times a day.     lidocaine (XYLOCAINE) 5 % ointment Apply to mid  back tid     meclizine (ANTIVERT) 25 mg tablet Take 25 mg by mouth every 4 (four) hours as needed. Give 1 hour before travel     multivitamin therapeutic tablet Take 1 tablet by mouth daily.     oxyCODONE (ROXICODONE) 5 MG immediate release tablet Take 0.5-1 tablets (2.5-5 mg total) by mouth every 4 (four) hours as needed.     polyethylene glycol (MIRALAX) 17 gram packet Take 1 packet (17 g total) by mouth daily.     senna-docusate (PERICOLACE) 8.6-50 mg tablet Take 1 tablet by mouth 2 (two) times a day.     traZODone (DESYREL) 50 MG tablet Take 0.5 tablets (25 mg total) by mouth at bedtime.       DISCHARGE DIAGNOSIS:    ICD-10-CM    1. Acute midline thoracic back pain M54.6    2. Cachexia (H) R64    3. Closed stable burst fracture of eighth thoracic vertebra, sequela S22.061S    4. Closed stable burst fracture of sixth thoracic vertebra with routine healing, subsequent encounter S22.051D    5. Pain management R52        MEDICAL EQUIPMENT NEEDS:  None needed for discharge   DISCHARGE PLAN/FACE TO FACE:  I certify that services are/were furnished while this patient was under the care of a physician and that a physician or an allowed non-physician practitioner (NPP), had a face-to-face encounter that meets the physician face-to-face encounter requirements. The encounter was in whole, or in part, related to the primary reason for home health. The patient is confined to his/her home and needs intermittent skilled nursing, physical therapy, speech-language pathology, or the continued need for occupational therapy. A plan of care has been established by a physician and is periodically reviewed by a physician.  Date of Face-to-Face Encounter: 2/25/20    I certify that, based on my findings, the following services are medically necessary home health services: N/A going to memory care     My clinical findings support the need for the above skilled services because: N/A   This patient is homebound because: N/A     The patient  is, or has been, under my care and I have initiated the establishment of the plan of care. This patient will be followed by a physician who will periodically review the plan of care.    Schedule follow up visit with primary care provider within 7 days to reestablish care.    Electronically signed by: Ofelia Ballesteros CNP

## 2021-06-06 NOTE — PROGRESS NOTES
"[facility]    Facility:   [facility] [[id number]]   Code Status: DNR      CHIEF COMPLAINT/REASON FOR VISIT:  Chief Complaint   Patient presents with     H & P       HISTORY:      HPI: Ms. Bob is 89-year-old female with a history of Alzheimer's dementia, osteoporosis with multiple spinal fractures, hyperlipidemia, malnutrition, vitamin D deficiency, seen for admission to the TCU setting following her recent hospital stay.    Hospital course patient was hospitalized between January 28 and January 31 due to acute on chronic back pain.  It is unknown if there is injury due to the fact that patient lives alone and has severe dementia.  Work-up revealed new T6 compression fraction with 50% height loss, superior and inferior endplate fractures of that sixth thoracic vertebra.  There was also an age-indeterminate T8 spinal fracture with 70% height loss which appeared new from 3 years ago.  3 years ago she had known T10 T12 L3-L4 fractures from a January 2017 admission.  Patient's vitamin D level in 2017 was 22.4.  Patient was treated with scheduled Tylenol, topical lidocaine, low-dose oxycodone 2.5 mg every 4 hours, MiraLAX and senna for bowel prophylaxis.  Patient was seen by PT OT with recommendation for TCU care.  Patient was fit with a TLSO brace.    Since arrival at the facility, pain has been difficult to manage.  She has been quite comfortable in bed but every time staff gets her up to put on the brace she will cry out in pain.  The brace itself seems to be as bad or worse than the getting up prior to as far as the staff can tell.  The fit is not snug and she struggles with it.  Staff is talked to the family about trying to go without the splint for comfort.    Subjective/review of systems  - Compromised by patient's dementia  - Augmented by discussion with nursing staff    \"I feel pretty good\".  Patient repeatedly denies pain but I do see her while she is lying in bed " without her TLSO on.  She does not recall being in the hospital.  She does not recall having back pain at all.  No other meaningful information could be obtained from the review of systems directly from the patient as she denied everything.  Staff reports the pain noted above.  They report that when she is not up she will eat when she is up she seems to uncomfortable to eat.  She has not been constipated.  No new falls or injuries.  No fevers.  Remainder 13 system ROS negative or unobtainable    Social history, patient believes she has 6 or 7 children.  She apparently has been living independently but she cannot tell me how or where.  I did call her daughter Rae but did not get an answer today.    Family history patient is unable to provide and I do not see documentation of it in the chart.    Past Medical History:   Diagnosis Date     Alzheimer's dementia (H)     per family report     Back pain 2017     Dementia (H)      Hyperlipidemia              No family history on file.  Social History     Socioeconomic History     Marital status:      Spouse name: Not on file     Number of children: Not on file     Years of education: Not on file     Highest education level: Not on file   Occupational History     Not on file   Social Needs     Financial resource strain: Not on file     Food insecurity:     Worry: Not on file     Inability: Not on file     Transportation needs:     Medical: Not on file     Non-medical: Not on file   Tobacco Use     Smoking status: Former Smoker     Packs/day: 1.00     Last attempt to quit: 1977     Years since quittin.1     Smokeless tobacco: Never Used     Tobacco comment: Quit smoking many years ago   Substance and Sexual Activity     Alcohol use: No     Drug use: No     Sexual activity: Not Currently   Lifestyle     Physical activity:     Days per week: Not on file     Minutes per session: Not on file     Stress: Not on file   Relationships     Social connections:      Talks on phone: Not on file     Gets together: Not on file     Attends Buddhist service: Not on file     Active member of club or organization: Not on file     Attends meetings of clubs or organizations: Not on file     Relationship status: Not on file     Intimate partner violence:     Fear of current or ex partner: Not on file     Emotionally abused: Not on file     Physically abused: Not on file     Forced sexual activity: Not on file   Other Topics Concern     Not on file   Social History Narrative     Not on file         Review of Systems as above    Vitals: Blood pressure 133/81 respirations 18 temperature 97.3 pulse 83 O2 sats 93% on room air weight is 83 pounds  Physical Exam  Cachectic elderly female who is resting in bed who greets me and is socially appropriate but minimally conversant.  Normocephalic atraumatic no raccoon or peraza sign she lets me see the anterior oropharynx which is clear neck is without mass or adenopathy heart is regular S1-S2 with soft systolic murmur at the left lower sternal border lungs are clear with good air movement no wheezing or accessory muscle use abdomen is thin denies pain, there is no guarding organomegaly or mass she allows me to palpate throughout the thoracic and lumbar spine and denies pain.  There is no bruising.  Paraspinous musculature is also nontender.  She reports sensation to light touch in both feet and legs.  She moves legs equally.  I do not get out of bed for gait checking.  She has no edema.  She has good capillary refill in hands and feet.  Skin is intact thin and dry in visualized areas  LABS:   Results for DEEPIKA BHATTI (MRN [id number]) as of 2/29/2020 17:44   Ref. Range 2/6/2020 05:50 2/10/2020 10:45   Sodium Latest Ref Range: 136 - 145 mmol/L 138    Potassium Latest Ref Range: 3.5 - 5.0 mmol/L 3.9    Chloride Latest Ref Range: 98 - 107 mmol/L 100    CO2 Latest Ref Range: 22 - 31 mmol/L 33 (H)    Anion Gap, Calculation Latest Ref Range: 5  - 18 mmol/L 5    BUN Latest Ref Range: 8 - 28 mg/dL 11    Creatinine Latest Ref Range: 0.60 - 1.10 mg/dL 0.46 (L)    GFR MDRD Af Amer Latest Ref Range: >60 mL/min/1.73m2 >60    GFR MDRD Non Af Amer Latest Ref Range: >60 mL/min/1.73m2 >60    Calcium Latest Ref Range: 8.5 - 10.5 mg/dL 8.9    Glucose Latest Ref Range: 70 - 125 mg/dL 82    WBC Latest Ref Range: 4.0 - 11.0 thou/uL 7.3    RBC Latest Ref Range: 3.80 - 5.40 mill/uL 3.56 (L)    Hemoglobin Latest Ref Range: 12.0 - 16.0 g/dL 11.4 (L)    Hematocrit Latest Ref Range: 35.0 - 47.0 % 35.3    MCV Latest Ref Range: 80 - 100 fL 99    MCH Latest Ref Range: 27.0 - 34.0 pg 32.0    MCHC Latest Ref Range: 32.0 - 36.0 g/dL 32.3    RDW Latest Ref Range: 11.0 - 14.5 % 14.4    Platelets Latest Ref Range: 140 - 440 thou/uL 338    MPV Latest Ref Range: 8.5 - 12.5 fL 8.8    CULTURE, URINE Unknown  Rpt   Color, UA Latest Ref Range: Colorless, Yellow, Straw, Light Yellow   Yellow   Clarity, UA Latest Ref Range: Clear   Cloudy (!)   Blood, UA Latest Ref Range: Negative   Negative   Bilirubin, UA Latest Ref Range: Negative   Negative   Urobilinogen, UA Latest Ref Range: <2.0 E.U./dL, 2.0 E.U./dL   <2.0 E.U./dL   Ketones, UA Latest Ref Range: Negative   Negative   Glucose, UA Latest Ref Range: Negative   Negative   Protein, UA Latest Ref Range: Negative mg/dL  Trace (!)   Nitrite, UA Latest Ref Range: Negative   Negative   Leukocytes, UA Latest Ref Range: Negative   Negative   pH, UA Latest Ref Range: 4.5 - 8.0   7.5   Specific Gravity, UA Latest Ref Range: 1.001 - 1.030   1.017   RBC, UA Latest Ref Range: None Seen, 0-2 hpf  0-2   WBC, UA Latest Ref Range: None Seen, 0-5 hpf  0-5   Bacteria, UA Latest Ref Range: None Seen hpf  None Seen   Mucus, UA Latest Ref Range: None Seen lpf  Few (!)   Squam Epithel, UA Latest Ref Range: None Seen, 0-5 lpf  0-5   Amorphous, UA Latest Ref Range: None Seen   Moderate (!)       ASSESSMENT:      ICD-10-CM    1. Alzheimer's dementia without  behavioral disturbance, unspecified timing of dementia onset (H) G30.9     F02.80    2. Acute midline thoracic back pain M54.6    3. Compression fracture of body of thoracic vertebra (H) S22.000A      Assessment/plan    New T6 superior/inferior endplate compression fracture with 50% height loss  Age-indeterminate T8 compression fracture with 70% height loss  Old T10 T12 L3-L4 compression fractures  Vitamin D deficiency  Osteoporosis      Pain control has not been good here.  She has required more opiates than she was getting in the hospital when pain was reportedly controlled.  However there is no history of new injuries or falls.  I suspect that this is ongoing pain rather than new/worsening pain however further imaging may be warranted if it does not settle down.  Highly likely to be due to the compression fractures due to the primary aggravating factor.  Nursing reports concern with a higher dose of oxycodone total.  They report that the 5 mg 3 times daily scheduled has made nursing cares go better.  We elected to continue the 5 mg 3 times daily scheduled and not the PRN down to 2.5 every 4 hours as needed.  Nursing staff/therapy staff is of the opinion that the brace is making things worse rather than better.  For quality of life they have talked to the family about going without the brace.  I am supportive of that as it appears patient is in a station where comfort needs to be primary focus.  I put a call into the daughter Rae but was unable to speak to her today.  - Opiate change as above  - Discontinue TLSO to see how it goes.  We could always come back to it if need be  -Consider additional imaging if this is truly worsening pain rather than unresolved pain  - Continue scheduled acetaminophen lidocaine patch gabapentin and bowel prophylaxis as current with senna/docusate twice daily and PEG daily    Osteoporosis  Vitamin D deficiency    She is getting 50,000 units weekly which is likely appropriate though  we did not check a vitamin D level yet.  May not be necessary to check levels before going with a more comfort care approach.  It is not clear to me that she is rehabbable-time will tell.  -Consider palliative care/hospice depending on patient's ability to rehab    Cachexia    BMI 14.66.  Dietary consult, high-protein supplements          MD gabriela Artis MD

## 2021-06-06 NOTE — PROGRESS NOTES
"NEUROSURGERY FOLLOW UP EVALUATION:  The patient's attending neurosurgeon is Dr. Ira Rodgers.    ASSESSMENT:  89 yo female with acute T6 fracture and age indeterminate T8 fractures and severe kyphosis being managed in [employer]. Brace is suboptimal due to patient's spinal curvature.     Discussed with family- pain has improved somewhat since adjustments made to medications, hopefully will continue to improve. If not we could consider vertebroplasty, although she is at risk of cord compromise should that cause retropulsion or migration of cement. At this time family wishes to continue with the brace.    Of note, should Ade have any other change in her health, or other major event it would be completely reasonable to consider hospice care.     PLAN:  Follow up in 4-6 weeks with repeat Xrays  Continue conservative management with brace      HPI:  Ade Bob is a 89 y.o. female, who initially presented 1/28/2020 for evaluation of severe mid back pain. Prior hx of osteoporosis and multiple other spinal fractures. Imaging revealed acute T6 and age indeterminate T8 burst fractures and multiple chronic fractures in the thoracic and lumbar spine. She was fitted for an [employer] Horizon brace and was discharged to a care facility.     Today she reports pain is \"tolerable\". She has significant dementia. Review of notes and discussion with family indicates she is frequently complaining of uncontrolled pain, only comfortable when she is laying down. Patient denies any new numbness, tingling, or weakness in the extremities. No change in bowel or bladder continence.     EXAM:  @VSR@     Oriented to self and family. speech fluent. Poor dentition with broken teeth  PERRL, EOMI, face symmetric, tongue midline, shoulder shrug equal  Motor Strength:  Hip flexors 5/5 right, 5/5 left   Quadriceps: 5/5 right, 5/5 left   Ankle dorsiflexion: 5/5 right, 5/5 left   Extensor hallicus longus: 5/5 right, 5/5 left   Ankle " plantar flexion : 5/5 right, 5/5 left     Bulk and tone: reduced, as expected for age  Reflexes: biceps, triceps, brachioradialis, patellar and achilles 0  No pathological reflexes        IMAGING:  The imaging was personally reviewed.  IMPRESSION:   Thoracic spine:  1.  Acute superior and inferior endplate compression fractures at T6 with 50% vertebral body height loss. Minimal retropulsion into the spinal canal.   2.  Age-indeterminate T8 compression fracture with 70% vertebral body height loss centrally and 1 to 2 mm retropulsion of the inferior endplate of the spinal canal which is age-indeterminate, though favored to be chronic. MR could be used to evaluate for   acuity if this would alter patient management.   3.  Chronic T10 compression fracture status post kyphoplasty, unchanged.   4.  Chronic T12 compression fracture with greater than 90% vertebral body height loss, unchanged.   5.  No other definite evidence of acute fracture or subluxation of the thoracic spine by CT imaging.     Lumbar spine:  1.  No evidence of acute fracture or subluxation of the lumbar spine by CT imaging.  2.  Chronic L3 compression fracture status post kyphoplasty.  3.  Chronic L4 compression fracture with 50% vertebral body height loss centrally.  4.  Degenerative lumbar spondylosis with level by level analysis as described above.      Shannan Art [facility]  [address], [address]  [place] [address]    O: [phone]  P: [phone]

## 2021-06-06 NOTE — PROGRESS NOTES
"[facility]    Facility:   [facility] [[id number]]   Code Status: DNR      CHIEF COMPLAINT/REASON FOR VISIT:  Chief Complaint   Patient presents with     Review Of Multiple Medical Conditions       HISTORY:      HPI: Ade is a 89 y.o. female undergoing physical and occupational therapy at [facility]. She is  with PMH of HLD, dementia, back pain, alzheimer's disease, underweight, malnutrition, T10, T12, L3, [id number] fractures for which she was hospitalized 3 years ago, who presents to the ED via [facility] with family for evaluation of back pain.   She was diagnosed with acute superior and inferior endplate compression fractures of T6 with 50% vertebral body height loss, minimal retropulsion into spinal canal and old T8 compression fractures. She was seen by neurosurgery and a [employer] TLSO brace was placed 1/29/2020.        Today she is seen for a routine visit to review multiple medical issues.  She did have a follow-up back x-rays yesterday and will follow up with MD on Thursday.  She does wear a  TLSO brace when out of bed.  She is alert to name only.  Today when asked about her pain she responded, \"pain is not too bad.\"  This is no because normally she would say her pain is very bad.  Her pain medications were recently adjusted and a UA was done which came back negative for UTI.  She denied chest pain or shortness of breath.  She denied cough or congestion.  She reports she is moving her bowels.    It appears she will need long-term care placement when she discharges.       Past Medical History:   Diagnosis Date     Alzheimer's dementia     per family report     Back pain 01/14/2017     Dementia      Hyperlipidemia              No family history on file.  Social History     Socioeconomic History     Marital status: Single     Spouse name: Not on file     Number of children: Not on file     Years of education: Not on file     " Highest education level: Not on file   Occupational History     Not on file   Social Needs     Financial resource strain: Not on file     Food insecurity:     Worry: Not on file     Inability: Not on file     Transportation needs:     Medical: Not on file     Non-medical: Not on file   Tobacco Use     Smoking status: Former Smoker     Packs/day: 1.00     Last attempt to quit: 1977     Years since quittin.1     Smokeless tobacco: Never Used     Tobacco comment: Quit smoking many years ago   Substance and Sexual Activity     Alcohol use: No     Drug use: No     Sexual activity: Not Currently   Lifestyle     Physical activity:     Days per week: Not on file     Minutes per session: Not on file     Stress: Not on file   Relationships     Social connections:     Talks on phone: Not on file     Gets together: Not on file     Attends Holiness service: Not on file     Active member of club or organization: Not on file     Attends meetings of clubs or organizations: Not on file     Relationship status: Not on file     Intimate partner violence:     Fear of current or ex partner: Not on file     Emotionally abused: Not on file     Physically abused: Not on file     Forced sexual activity: Not on file   Other Topics Concern     Not on file   Social History Narrative     Not on file         Review of Systems   Constitutional: Positive for activity change. Negative for appetite change, fatigue and fever.   HENT: Negative.  Negative for congestion.    Eyes: Negative.    Respiratory: Negative.  Negative for cough, shortness of breath and wheezing.    Cardiovascular: Negative.  Negative for chest pain and leg swelling.   Gastrointestinal: Negative.  Negative for abdominal distention, abdominal pain, constipation, diarrhea and nausea.   Endocrine: Negative.    Genitourinary: Negative.  Negative for dysuria.   Musculoskeletal: Positive for back pain. Negative for arthralgias.   Skin: Negative.  Negative for color change and  wound.   Neurological: Negative.  Negative for dizziness.   Hematological: Negative.    Psychiatric/Behavioral: Negative.  Negative for agitation, behavioral problems and confusion.       Vitals:    02/18/20 1029   BP: 129/70   Pulse: 82   Resp: 16   Temp: (!) 96.5  F (35.8  C)   SpO2: 92%   Weight: (!) 88 lb 6.4 oz (40.1 kg)       Physical Exam  Constitutional:       Appearance: She is well-developed.   HENT:      Head: Normocephalic.   Eyes:      Conjunctiva/sclera: Conjunctivae normal.   Neck:      Musculoskeletal: Normal range of motion.   Cardiovascular:      Rate and Rhythm: Normal rate and regular rhythm.      Heart sounds: Normal heart sounds. No murmur.   Pulmonary:      Effort: No respiratory distress.      Breath sounds: Normal breath sounds. No wheezing or rales.   Abdominal:      General: Bowel sounds are normal. There is no distension.      Palpations: Abdomen is soft.      Tenderness: There is no abdominal tenderness.   Musculoskeletal: Normal range of motion.      Comments: Patient wearing a TLSO brace   Skin:     General: Skin is warm.   Neurological:      Mental Status: She is alert.      Comments: Alert and oriented to self   Psychiatric:         Behavior: Behavior normal.           LABS:   Recent Results (from the past 240 hour(s))   Urinalysis   Result Value Ref Range    Color, UA Yellow Colorless, Yellow, Straw, Light Yellow    Clarity, UA Cloudy (!) Clear    Glucose, UA Negative Negative    Bilirubin, UA Negative Negative    Ketones, UA Negative Negative    Specific Gravity, UA 1.017 1.001 - 1.030    Blood, UA Negative Negative    pH, UA 7.5 4.5 - 8.0    Protein, UA Trace (!) Negative mg/dL    Urobilinogen, UA <2.0 E.U./dL <2.0 E.U./dL, 2.0 E.U./dL    Nitrite, UA Negative Negative    Leukocytes, UA Negative Negative    Bacteria, UA None Seen None Seen hpf    RBC, UA 0-2 None Seen, 0-2 hpf    WBC, UA 0-5 None Seen, 0-5 hpf    Squam Epithel, UA 0-5 None Seen, 0-5 lpf    Amorphous, UA Moderate  (!) None Seen    Mucus, UA Few (!) None Seen lpf   Culture, Urine   Result Value Ref Range    Culture No Growth        ASSESSMENT:      ICD-10-CM    1. Acute midline thoracic back pain M54.6    2. Malnutrition, unspecified type (H) E46    3. Closed stable burst fracture of eighth thoracic vertebra, sequela S22.061S    4. Closed stable burst fracture of sixth thoracic vertebra with routine healing, subsequent encounter S22.051D    5. Pain management R52        PLAN:    T6, T8 burst fracture PT/OT, on Alendronate weekly pain control    Malnutrition dietary following, daily weights     Vit d deficiency- 2000 IU daily recheck lab in one month , Last Vit D 43.2 on 2/5/20    Insomnia On Trazadone    Pain management patient on Tylenol 1000 mg every 6 hours scheduled.      Electronically signed by: Ofelia Ballesteros CNP

## 2021-06-06 NOTE — PROGRESS NOTES
[facility]    Facility:   [facility] [[id number]]   Code Status: DNR       Chief Complaint   Patient presents with     Follow Up     TCU 2/13/20.       TCU HPI:   Ade is a 89 y.o. female with hx of dementia, prior compression fractures, presented to the hospital on 1/28/20 with back pain.     From Hospital HPI:  Ade Bob is a 89 y.o. old female with h/o pertinent history of hyperlipdemia, dementia, back pain, alzheimer's disease, and malnutrition who presents to this ED via [facility] with family for evaluation of back pain.  Family present and provides information, her back pain is in the middle of her back, constant, and unchanging. Per family, patient lives at lone but family frequently visits to check on her. No one has noticed any recent fall or trauma and patient denies, but she has dementia.  Patient was in the hospital 3 years ago for fractured T10-L3 vertebrae, but has otherwise been healthy. Shes had severe back pain.     From Hospital DC Summary:  Ade Bob is a 89 y.o. F who lives independently, with PMH of HLD, dementia, back pain, alzheimer's disease, underweight, malnutrition, T10, T12, L3, [id number] fractures for which she was hospitalized 3 years ago, who presents to this ED via [facility] with family for evaluation of back pain.   She was diagnosed with acute superior and inferior endplate compression fractures of T6 with 50% vertebral body height loss, minimal retropulsion into spinal canal and old T8 compression fractures. She was seen by neurosurgery and a [place] [employer] TLSO brace was placed 1/29/2020.  Pain management with scheduled Tylenol, topical lidocaine and low-dose, 2.5 mg every 4 hours as needed oxycodone.  MiraLAX/senna for opioid-induced constipation prophylaxis.  PT/OT evaluated, recommended TCU.      Vitamin D insufficiency, level low at 22.4 on 1/16/17.  No most recent vitamin D level  recheck.  On Fosamax.  Added vitamin D and multivitamins.  Check vitamin D level as outpatient.     Primary insomnia-started low-dose 5 mg trazodone.     Moderate protein calorie malnutrition, failure to thrive in adult, underweight, Body mass index is 14.23 kg/m .  Patient was seen by RD.  Commended multivitamins, Magic cup twice a day with meals.     Overall stabilized and discharged to TCU on 1/31/20 for PT, OT, nursing cares, medical management and monitoring.      Today:  Seen today for reports of unmanaged pain. She has chronic and new compression fractures, has brace to wear. Reports significant back pain, difficult to participate in therapy, depends how she is positioned.. Has scheduled tylenol and prn oxycodone, reviewed with nursing to be sure she is getting it and to watch for side effects, confusion etc. Seems to be tolerating yet not effective. Will add gabapentin in hopes to help with pain management and therapies. She is very frail. Appetite is fair. No other concerns. She denies fever, cough, congestion, dizziness, shortness of breath or chest pain. No diarrhea or constipation. Once positioned well in bed, she is able to sleep per her report.       Past Medical History:  Past Medical History:   Diagnosis Date     Alzheimer's dementia     per family report     Back pain 01/14/2017     Dementia      Hyperlipidemia        Medications:  Current Outpatient Medications   Medication Sig     acetaminophen (TYLENOL) 325 MG tablet Take 2 tablets (650 mg total) by mouth 3 (three) times a day.     alendronate (FOSAMAX) 70 MG tablet Take 70 mg by mouth once a week. Saturdays     aluminum-magnesium hydroxide-simethicone (MAALOX ADVANCED) 200-200-20 mg/5 mL Susp Take 30 mL by mouth every 4 (four) hours as needed.     ergocalciferol (ERGOCALCIFEROL) 1,250 mcg (50,000 unit) capsule Take 1 capsule (50,000 Units total) by mouth once a week.     lidocaine (XYLOCAINE) 5 % ointment Apply to mid back tid     multivitamin  therapeutic tablet Take 1 tablet by mouth daily.     oxyCODONE (ROXICODONE) 5 MG immediate release tablet Take 0.5-1 tablets (2.5-5 mg total) by mouth every 4 (four) hours as needed.     polyethylene glycol (MIRALAX) 17 gram packet Take 1 packet (17 g total) by mouth daily.     senna-docusate (PERICOLACE) 8.6-50 mg tablet Take 1 tablet by mouth 2 (two) times a day.     traZODone (DESYREL) 50 MG tablet Take 0.5 tablets (25 mg total) by mouth at bedtime.       Physical Exam:   General: Patient is alert elderly female, no distress. Thin and frail.   Vitals: /68, Temp 98.1, Pulse 85, RR 18, O2 sat 93%RA.  HEENT: Head is NCAT. Eyes show no injection or icterus. Nares negative. Oropharynx well hydrated.  Neck: Supple. No tenderness or adenopathy. No JVD.  Lungs: Clear bilaterally. No wheezes.  Cardiovascular: Regular rate and rhythm, normal S1. S2.  Back: No spinal or CVA tenderness. Kyphosis.   Abdomen: Soft, no tenderness on exam. Bowel sounds present. No guarding rebound or rigidity.  Extremities: No edema is noted.  Musculoskeletal: Age related degen changes.   Skin: Warm and dry.         Labs:  Lab Results   Component Value Date    WBC 7.3 02/06/2020    HGB 11.4 (L) 02/06/2020    HCT 35.3 02/06/2020    MCV 99 02/06/2020     02/06/2020     Results for orders placed or performed in visit on 02/06/20   Basic Metabolic Panel   Result Value Ref Range    Sodium 138 136 - 145 mmol/L    Potassium 3.9 3.5 - 5.0 mmol/L    Chloride 100 98 - 107 mmol/L    CO2 33 (H) 22 - 31 mmol/L    Anion Gap, Calculation 5 5 - 18 mmol/L    Glucose 82 70 - 125 mg/dL    Calcium 8.9 8.5 - 10.5 mg/dL    BUN 11 8 - 28 mg/dL    Creatinine 0.46 (L) 0.60 - 1.10 mg/dL    GFR MDRD Af Amer >60 >60 mL/min/1.73m2    GFR MDRD Non Af Amer >60 >60 mL/min/1.73m2         Assessment/Plan:  1. Acute on chronic back pain. Acute T6 fx with old stable fxs T8, T10, L3, L4. Has TLSO. On tylenol with low dose prn oxycodone, continue those with increased  nursing assessments, add gabapentin low dose 100mg three times daily, watch closely for side effects, altered mental cameron in elderly with hx of dementia. Has appt next week for xrays and then later in week MD appt for follow up.  2. Alzheimer dementia.   3. Vitamin D insufficieny. On replacement.  4. Insomnia. Continue trazodone.  5. Osteoporosis. On Alendronate.          Electronically signed by: Flory Stubbs MD

## 2021-06-06 NOTE — PROGRESS NOTES
[facility]    Facility:   [facility] [[id number]]   Code Status: DNR      CHIEF COMPLAINT/REASON FOR VISIT:  Chief Complaint   Patient presents with     Review Of Multiple Medical Conditions       HISTORY:      HPI:  Ms. Bob is 89-year-old female with a history of Alzheimer's dementia, osteoporosis with multiple spinal fractures, hyperlipidemia, malnutrition, vitamin D deficiency, seen for admission to the TCU setting following her recent hospital stay.     Hospital course patient was hospitalized between January 28 and January 31 due to acute on chronic back pain.  It is unknown if there is injury due to the fact that patient lives alone and has severe dementia.  Work-up revealed new T6 compression fraction with 50% height loss, superior and inferior endplate fractures of that sixth thoracic vertebra.  There was also an age-indeterminate T8 spinal fracture with 70% height loss which appeared new from 3 years ago.  3 years ago she had known T10 T12 L3-L4 fractures from a January 2017 admission.  Patient's vitamin D level in 2017 was 22.4.  Patient was treated with scheduled Tylenol, topical lidocaine, low-dose oxycodone 2.5 mg every 4 hours, MiraLAX and senna for bowel prophylaxis.  Patient was seen by PT OT with recommendation for TCU care.  Patient was fit with a TLSO brace.     Since arrival at the facility, pain has been difficult to manage.  She has been quite comfortable in bed but every time staff gets her up to put on the brace she will cry out in pain.  The brace itself seems to be as bad or worse than the getting up prior to as far as the staff can tell.  The fit is not snug and she struggles with it.  Staff is talked to the family about trying to go without the splint for comfort.     Subjective/review of systems  Follow-up with patient to  review her back pain.  I met her last week and based on her difficulty being up and dealing with the back brace we  "discontinued mandatory use of the back brace when up.  Is able to discuss the situation with the physical therapist who knows her well.  They say that she is doing better and that she can actually walk the entire length of the henley with her walker without apparent discomfort.  Staff reports that it is not is difficult to get her up out of bed although that she would still prefer to be in bed above all other things.  Therapist says that they are most concerned about rotational bending but due to the patient's stability as well as her consistent ability to \"square wrap\" with transfers she is not at high risk for rotational injury.    Patient is dependent on staff for all cares.  She particularly does not seem to want to eat and the staff is working with her and basically feeding her in which case she does eat.    There is been no trouble with constipation no new falls or injuries no fever sweats or chills.  Patient is unable to contribute to history and review of systems due to her severe dementia.  She does tell me that she has no pain    Past Medical History:   Diagnosis Date     Alzheimer's dementia (H)     per family report     Back pain 2017     Dementia (H)      Hyperlipidemia              No family history on file.  Social History     Socioeconomic History     Marital status:      Spouse name: Not on file     Number of children: Not on file     Years of education: Not on file     Highest education level: Not on file   Occupational History     Not on file   Social Needs     Financial resource strain: Not on file     Food insecurity:     Worry: Not on file     Inability: Not on file     Transportation needs:     Medical: Not on file     Non-medical: Not on file   Tobacco Use     Smoking status: Former Smoker     Packs/day: 1.00     Last attempt to quit: 1977     Years since quittin.2     Smokeless tobacco: Never Used     Tobacco comment: Quit smoking many years ago   Substance and Sexual " Activity     Alcohol use: No     Drug use: No     Sexual activity: Not Currently   Lifestyle     Physical activity:     Days per week: Not on file     Minutes per session: Not on file     Stress: Not on file   Relationships     Social connections:     Talks on phone: Not on file     Gets together: Not on file     Attends Yarsanism service: Not on file     Active member of club or organization: Not on file     Attends meetings of clubs or organizations: Not on file     Relationship status: Not on file     Intimate partner violence:     Fear of current or ex partner: Not on file     Emotionally abused: Not on file     Physically abused: Not on file     Forced sexual activity: Not on file   Other Topics Concern     Not on file   Social History Narrative     Not on file         Review of Systems as above    Weight is 81 pounds O2 sats 91% blood pressure 125/70 respirations 18 temperature 96.2 heart rate 96  Physical Exam  Patient is alert pleasantly confused and minimally conversant.  No sign of sedation.  No sign of pain or discomfort lying in bed.  Back is kyphotic she complains of no tenderness with pain in the thoracolumbar bony prominences as well as the paraspinous musculature nothing new  LABS:   Nothing new    ASSESSMENT:      ICD-10-CM    1. Compression fracture of body of thoracic vertebra (H) S22.000A        Assessment/plan    New T6 superior/inferior endplate compression fracture with 50% height loss  Age-indeterminate T8 compression fracture with 70% height loss  Old T10 T12 L3-L4 compression fractures  Vitamin D deficiency  Osteoporosis        Staff reports that she has no pain at rest.  Her pain seems better when up by quite a margin over the last week.  I discussed with  her therapist whether we should try to use the TLSO again, perhaps in discussion with the family.  For now we do not reinstitute that order since she seems to be at relatively low risk of rotational injury  - PRN oxycodone, continue  scheduled acetaminophen for now but likely back off the PRN if her pain continues.  Likewise with the topical lidocaine continue as current for now but may be able to back off in the foreseeable future  - Remains off  TLSO brace for now though it is an option to reinstitute it depending on course     Osteoporosis  Vitamin D deficiency     She is getting 50,000 units weekly which is likely appropriate though we did not check a vitamin D level yet.  May not be necessary to check levels before going with a more comfort care approach.  It is not clear to me that she is rehabbable-time will tell.  -Consider palliative care/hospice depending on patient's ability to rehab     Cachexia     BMI 14.66.  Dietary consult, high-protein supplements continue assist with feeding          Electronically signed by: Joshua Ott MD

## 2021-06-06 NOTE — TELEPHONE ENCOUNTER
[facility] Nurse Triage Telephone Note      Provider: MARITA Kaye  Facility: [facility]    Facility Type: [facility]    Caller: Rivka  Call Back Number:  [phone]    Allergies: Cefdinir and Sulfa (sulfonamide antibiotics)    Reason for call: Nurse reporting that patient is a new admit with spinal fx's.  She's in pain with movement.  She does wear a brace when out of bed, but screams in pain while wearing brace until she's back in bed.  Currently has orders for Tylenol 1000mg Q 6 hours, Oxycodone 2.5-5mg Q 4 hours PRN, Gabapentin 100mg three times a day.  Nurse is requesting med changes for better pain control.       Verbal Order/Direction given by Provider: Change Tylenol to 1000mg four times a day.  Change Oxycodone to 5mg three times a day scheduled and continue 2.5mg every 4 hours PRN for pain 1-5/10 and 5mg every 4 hours PRN for pain 6-10/10.      Provider giving order: MARITA Kaye    Verbal order given to: Rivka Salcedo RN

## 2021-06-06 NOTE — PROGRESS NOTES
[facility]    Facility:   [facility] [[id number]]   Code Status: DNR      CHIEF COMPLAINT/REASON FOR VISIT:  Chief Complaint   Patient presents with     Review Of Multiple Medical Conditions       HISTORY:      HPI: Ade is a 89 y.o. female undergoing physical and occupational therapy at [facility]. She is  with PMH of HLD, dementia, back pain, alzheimer's disease, underweight, malnutrition, T10, T12, L3, [id number] fractures for which she was hospitalized 3 years ago, who presents to the ED via [facility] with family for evaluation of back pain.   She was diagnosed with acute superior and inferior endplate compression fractures of T6 with 50% vertebral body height loss, minimal retropulsion into spinal canal and old T8 compression fractures. She was seen by neurosurgery and a [employer] TLSO brace was placed 1/29/2020.        Today she is seen for reports of increased pain low back.  Per staff patient constantly screaming out even when she is not touched.  Patient was seen in her room and was verbalizing pain in her back.  When asked where it hurts she pointed to her mid back.  She did have a positive CVA on the left but negative on the right.  a urine sample was checked and it came back negative.  Patient's Tylenol was increased from 650 mg 3 times daily to 1000 mg every 6 hours.  She is also on as needed oxycodone and a lidocaine ointment to her low back.    She denied chest pain or shortness of breath.  She denied cough or congestion.  She reports she is moving her bowels.    It appears she will need long-term care placement when she discharges.    She is wearing a TLSO brace when out of bed.  She was oriented to self.  .    Past Medical History:   Diagnosis Date     Alzheimer's dementia     per family report     Back pain 01/14/2017     Dementia      Hyperlipidemia              No family history on file.  Social History      Socioeconomic History     Marital status: Single     Spouse name: Not on file     Number of children: Not on file     Years of education: Not on file     Highest education level: Not on file   Occupational History     Not on file   Social Needs     Financial resource strain: Not on file     Food insecurity:     Worry: Not on file     Inability: Not on file     Transportation needs:     Medical: Not on file     Non-medical: Not on file   Tobacco Use     Smoking status: Former Smoker     Packs/day: 1.00     Last attempt to quit: 1977     Years since quittin.1     Smokeless tobacco: Never Used     Tobacco comment: Quit smoking many years ago   Substance and Sexual Activity     Alcohol use: No     Drug use: No     Sexual activity: Not Currently   Lifestyle     Physical activity:     Days per week: Not on file     Minutes per session: Not on file     Stress: Not on file   Relationships     Social connections:     Talks on phone: Not on file     Gets together: Not on file     Attends Mosque service: Not on file     Active member of club or organization: Not on file     Attends meetings of clubs or organizations: Not on file     Relationship status: Not on file     Intimate partner violence:     Fear of current or ex partner: Not on file     Emotionally abused: Not on file     Physically abused: Not on file     Forced sexual activity: Not on file   Other Topics Concern     Not on file   Social History Narrative     Not on file         Review of Systems   Constitutional: Positive for activity change. Negative for appetite change, fatigue and fever.   HENT: Negative.  Negative for congestion.    Eyes: Negative.    Respiratory: Negative.  Negative for cough, shortness of breath and wheezing.    Cardiovascular: Negative.  Negative for chest pain and leg swelling.   Gastrointestinal: Negative.  Negative for abdominal distention, abdominal pain, constipation, diarrhea and nausea.   Endocrine: Negative.     Genitourinary: Negative.  Negative for dysuria.   Musculoskeletal: Positive for back pain. Negative for arthralgias.   Skin: Negative.  Negative for color change and wound.   Neurological: Negative.  Negative for dizziness.   Hematological: Negative.    Psychiatric/Behavioral: Negative.  Negative for agitation, behavioral problems and confusion.       Vitals:    02/10/20 1210   BP: (!) 154/93   Pulse: 99   Resp: 18   Temp: 97.4  F (36.3  C)   SpO2: 94%   Weight: (!) 88 lb 6.4 oz (40.1 kg)       Physical Exam  Constitutional:       Appearance: She is well-developed.   HENT:      Head: Normocephalic.   Eyes:      Conjunctiva/sclera: Conjunctivae normal.   Neck:      Musculoskeletal: Normal range of motion.   Cardiovascular:      Rate and Rhythm: Normal rate and regular rhythm.      Heart sounds: Normal heart sounds. No murmur.   Pulmonary:      Effort: No respiratory distress.      Breath sounds: Normal breath sounds. No wheezing or rales.   Abdominal:      General: Bowel sounds are normal. There is no distension.      Palpations: Abdomen is soft.      Tenderness: There is no abdominal tenderness.   Musculoskeletal: Normal range of motion.      Comments: Patient wearing a TLSO brace   Skin:     General: Skin is warm.   Neurological:      Mental Status: She is alert.      Comments: Alert and oriented to self   Psychiatric:         Behavior: Behavior normal.           LABS:   Recent Results (from the past 240 hour(s))   Vitamin D, Total (25-Hydroxy)   Result Value Ref Range    Vitamin D, Total (25-Hydroxy) 43.2 30.0 - 80.0 ng/mL   Basic Metabolic Panel   Result Value Ref Range    Sodium 138 136 - 145 mmol/L    Potassium 3.9 3.5 - 5.0 mmol/L    Chloride 100 98 - 107 mmol/L    CO2 33 (H) 22 - 31 mmol/L    Anion Gap, Calculation 5 5 - 18 mmol/L    Glucose 82 70 - 125 mg/dL    Calcium 8.9 8.5 - 10.5 mg/dL    BUN 11 8 - 28 mg/dL    Creatinine 0.46 (L) 0.60 - 1.10 mg/dL    GFR MDRD Af Amer >60 >60 mL/min/1.73m2    GFR MDRD  Non Af Amer >60 >60 mL/min/1.73m2   HM2(CBC w/o Differential)   Result Value Ref Range    WBC 7.3 4.0 - 11.0 thou/uL    RBC 3.56 (L) 3.80 - 5.40 mill/uL    Hemoglobin 11.4 (L) 12.0 - 16.0 g/dL    Hematocrit 35.3 35.0 - 47.0 %    MCV 99 80 - 100 fL    MCH 32.0 27.0 - 34.0 pg    MCHC 32.3 32.0 - 36.0 g/dL    RDW 14.4 11.0 - 14.5 %    Platelets 338 140 - 440 thou/uL    MPV 8.8 8.5 - 12.5 fL   Urinalysis   Result Value Ref Range    Color, UA Yellow Colorless, Yellow, Straw, Light Yellow    Clarity, UA Cloudy (!) Clear    Glucose, UA Negative Negative    Bilirubin, UA Negative Negative    Ketones, UA Negative Negative    Specific Gravity, UA 1.017 1.001 - 1.030    Blood, UA Negative Negative    pH, UA 7.5 4.5 - 8.0    Protein, UA Trace (!) Negative mg/dL    Urobilinogen, UA <2.0 E.U./dL <2.0 E.U./dL, 2.0 E.U./dL    Nitrite, UA Negative Negative    Leukocytes, UA Negative Negative    Bacteria, UA None Seen None Seen hpf    RBC, UA 0-2 None Seen, 0-2 hpf    WBC, UA 0-5 None Seen, 0-5 hpf    Squam Epithel, UA 0-5 None Seen, 0-5 lpf    Amorphous, UA Moderate (!) None Seen    Mucus, UA Few (!) None Seen lpf       ASSESSMENT:      ICD-10-CM    1. Acute midline thoracic back pain M54.6    2. Malnutrition, unspecified type (H) E46    3. Closed stable burst fracture of sixth thoracic vertebra with routine healing, subsequent encounter S22.051D    4. Closed stable burst fracture of eighth thoracic vertebra, sequela S22.061S    5. Pain management R52        PLAN:    T6, T8 burst fracture PT/OT, on Alendronate weekly pain control    Malnutrition dietary consult , daily weights     Vit d deficiency- 2000 IU daily recheck lab in one month , Last Vit D 43.2 on 2/5/20    Insomnia On Trazadone    Pain management Tylenol 650 MG three times a day dc'd and patient started on 1000 mg every 6 hours scheduled.    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

## 2021-06-07 NOTE — PROGRESS NOTES
Called and spoke with patient's family member Rae. Rae reports they discontinued Ade's brace a few weeks ago, she would only cry when it was placed and rae felt this was causing more harm than good. They are going more comfort focused care for ade, who resides in a locked memory care unit. No further follow up. Rae reported she would call if they have further questions.     No charge visit.

## 2021-06-07 NOTE — PROGRESS NOTES
Code Status:  DNR  Visit Type: Follow Up (Pain, sleepiness, dementia)     Facility:  [facility] [[id number]]        Facility Type: SNF (Skilled Nursing Facility, TCU)    History of Present Illness: Ade Bob is a 89 y.o. female who has a past medical history for Alzheimer's dementia, osteoporosis with multiple spinal fractures, HLD, malnutrition, vitamin D deficiency.  Recently hospitalized 1/28 to 1/31 for acute on chronic back pain and found to have T6 compression fracture with 50% height loss, superior and inferior endplate fractures of the sixth thoracic vertebrae and T8 spinal fracture with 70% height loss which appears new from 3 years ago.  She had previous T10, T12, L3-L4 fractures.  Her vitamin D level was 22.4.  She was treated with pain medications and bowel prophylaxis.  She was also told to wear TLSO when up out of bed however this provided her extreme discomfort and so this was discontinued in the past couple weeks to add more comfort care measures.    Today, she is lying in bed and states that she is very sleepy.  Her oxycodone was recently increased to scheduled oxycodone 5 mg 3 times daily and 2.5 mg every 6 hours as needed.  She denies any pain today.  Her blood pressures and heart rate have been in good control.  Her dementia does require her significant supervision and she is in the memory locked unit.      Review of Systems   Review of systems is difficult due to patient's dementia.  Nursing denies any new issues including her bowels and bladder.    Physical Exam   Vital signs: /87, heart rate 103 with most 80s to 90s, 18 for respiratory, 97.5 temp.  GENERAL APPEARANCE: In, elderly woman in no acute distress.  HEENT: normocephalic, atraumatic  sclerae anicteric, conjunctivae clear and moist, EOM intact  LUNGS: respiratory effort normal.  CARD: Perfusing peripherally upper and lower extremities  EXTREMITIES: No cyanosis, clubbing or edema.  NEURO: Alert with  cognitive impairment. Face is symmetric.  SKIN: Inspection of the skin reveals no rashes, ulcerations or petechiae.  PSYCH: euthymic          Labs:  All labs reviewed in the nursing home record.    Assessment:  1. Compression fracture of body of thoracic vertebra (H)     2. Acute midline thoracic back pain     3. Alzheimer's dementia without behavioral disturbance, unspecified timing of dementia onset (H)     4. Pain management         Plan:   At this time she does spend a fair amount of her time in bed due to significant discomfort with the TLSO brace.  This is been put on hold we will continue with that.  Her pain is fairly stable with Tylenol, Lidoderm gel and scheduled oxycodone.  We will continue with the scheduled oxycodone 5 mg 3 times daily and discontinue the PRN oxycodone due to complaints of sleepiness.  Would like to consider weaning her off the oxycodone scheduled doses in the near future.    Continue on dementia locked unit.      All other medications will continue along with current treatments.  Chronic conditions are stable at this time.    Electronically signed by: Karyn Valencia CNP

## 2021-06-08 NOTE — PROGRESS NOTES
[facility]      NAME:  Ade Bob             :  10/23/1927    MRN: [id number]    CODE STATUS:  DNR    FACILITY: [facility] [[id number]]         CHIEF COMPLAIN/REASON FOR VISIT:  Chief Complaint   Patient presents with     FVP Care Coordination - Home Visit-Initial Assessment     WT LOSS       HISTORY OF PRESENT ILLNESS: Ade Bob is a 89 y.o. female being seen today for an annual review for Terry members requirement. She lives in a memory care unit in a LTC facility. Very poor historian so info gathered from nursing staff, MR as well as personal assessment. She is a frail elderly female with dementia, Alzheimer's, malnutrition, current wt 78.8lb and HO osteoporosis with several spinal fractures.  Seen in room today, smiling and pleasant. Saff reports no issues with behaviors. Often up for meals but than wants back to bed, skin intact.Staff report she does not seem to have pain and does sleep well.  Allergies   Allergen Reactions     Cefdinir Nausea Only     Sulfa (Sulfonamide Antibiotics) Unknown and Hives   :     Current Outpatient Medications   Medication Sig     mirtazapine (REMERON) 7.5 MG tablet Take 7.5 mg by mouth at bedtime.     acetaminophen (TYLENOL) 325 MG tablet Take 650 mg by mouth see administration instructions. Take 650 three times a day and take 650 daily prn     acetaminophen (TYLENOL) 500 MG tablet Take 1,000 mg by mouth 4 (four) times a day.     alendronate (FOSAMAX) 70 MG tablet Take 70 mg by mouth once a week.      aluminum-magnesium hydroxide-simethicone (MAALOX ADVANCED) 200-200-20 mg/5 mL Susp Take 30 mL by mouth every 4 (four) hours as needed.     cholecalciferol, vitamin D3, 1,000 unit (25 mcg) tablet Take 2,000 Units by mouth daily.     gabapentin (NEURONTIN) 100 MG capsule Take 100 mg by mouth 3 (three) times a day.     lidocaine (XYLOCAINE) 5 % ointment Apply to mid back tid     meclizine (ANTIVERT) 25 mg  tablet Take 25 mg by mouth every 4 (four) hours as needed. Give 1 hour before travel     multivitamin therapeutic tablet Take 1 tablet by mouth daily.     oxyCODONE (ROXICODONE) 5 MG immediate release tablet Take 5 mg by mouth 3 (three) times a day as needed.      polyethylene glycol (MIRALAX) 17 gram packet Take 1 packet (17 g total) by mouth daily.     senna-docusate (PERICOLACE) 8.6-50 mg tablet Take 1 tablet by mouth 2 (two) times a day.     traZODone (DESYREL) 50 MG tablet Take 0.5 tablets (25 mg total) by mouth at bedtime.         REVIEW OF SYSTEMS:    Due to advanced Alzheimer's unable to participate in ROS.      PHYSICAL EXAMINATION:  Vitals:    06/17/20 0839   BP: 95/66   Pulse: 87   Temp: 97.8  F (36.6  C)   Weight: (!) 78 lb 12.8 oz (35.7 kg)         GENERAL: Awake, Alert, oriented TO SELF, not in any form of acute distress, unable to answers questions appropriately,commands, conversant  HEENT: Head is normocephalic with normal hair distribution. No evidence of trauma. Ears: No acute purulent discharge. Eyes: Conjunctivae pink with no scleral jaundice. Nose: Normal mucosa and septum. NECK: Supple with no cervical or supraclavicular lymphadenopathy. Trachea is midline. Poor dental , broken teeth but clean oral cavity  CHEST: No tenderness or deformity, no crepitus  LUNG: Clear to auscultation with good chest expansion. There are no crackles or wheezes, normal AP diameter.  BACK: No kyphosis of the thoracic spine. Symmetric, no curvature, ROM normal, no CVA tenderness, no spinal tenderness   CVS: There is good S1  S2, rhythm is regular.  ABDOMEN: Globular and soft, nontender to palpation, non distended, no masses, no organomegaly, good bowel sounds, no rebound or guarding, no peritoneal signs.   EXTREMITIES: Atraumatic. Full range of motion on both upper and lower extremities, dependant on staff for mobility and uses wc when OOB  SKIN: Warm and dry, no erythema noted, no rashes or lesions.  NEUROLOGICAL:  The patient is oriented to person, [facility]        LABS:    Lab Results   Component Value Date    WBC 7.3 02/06/2020    HGB 11.4 (L) 02/06/2020    HCT 35.3 02/06/2020    MCV 99 02/06/2020     02/06/2020       Results for orders placed or performed in visit on 02/06/20   Basic Metabolic Panel   Result Value Ref Range    Sodium 138 136 - 145 mmol/L    Potassium 3.9 3.5 - 5.0 mmol/L    Chloride 100 98 - 107 mmol/L    CO2 33 (H) 22 - 31 mmol/L    Anion Gap, Calculation 5 5 - 18 mmol/L    Glucose 82 70 - 125 mg/dL    Calcium 8.9 8.5 - 10.5 mg/dL    BUN 11 8 - 28 mg/dL    Creatinine 0.46 (L) 0.60 - 1.10 mg/dL    GFR MDRD Af Amer >60 >60 mL/min/1.73m2    GFR MDRD Non Af Amer >60 >60 mL/min/1.73m2           No results found for: HGBA1C  Vitamin D, Total (25-Hydroxy)   Date Value Ref Range Status   02/04/2020 43.2 30.0 - 80.0 ng/mL Final     No results found for: [id number]    ASSESSMENT/PLAN:  1. Alzheimer's dementia without behavioral disturbance, unspecified timing of dementia onset (H)    2. Severe protein-calorie malnutrition (H)      1. DEMENTIA: RESIDES IN MEMORY CARE UNIT WHERE NEEDS ARE MET AND ANTICIPATED PER STAFF ON UNIT.    2. Malnutrtion: wt at 78.8lbs, no skin impairments. Offer supplements. Try Remeron 7.5 mg Q HS if family in agreement to see if appetite improves. Staff reports miriam appetite.     Case Management:     I have reviewed the facility care plan/MDS which was done on , 5/28/20  including the falls risk, nutrition and pain screening. I also reviewed the current immunizations, and preventive care.. Future cancer screening is not clinically indicated secondary to age/goals of care.   Patient's desire to return to the community is not assessible due to her desire to Alzheimer's advancement  Advance Directive : DNR, pr staff and medical records, pt cannot participate in discussion  I reviewed the current advanced directives as reflected at  the facility chart.   .     Team Discussion:  I  communicated with the appropriate disciplines involved with the Plan of Care:   Nursing       Patient Goal:  Patient's goal is comfort focus and DNR     Information reviewed:  Medications, vital signs, orders, and nursing notes.         Electronically signed by:  Kylie Gambino CNP  This progress note was completed using Dragon software and there may be grammatical errors.

## 2021-06-08 NOTE — PROGRESS NOTES
"    Medical care for seniors/[facility] telehealth visit        Video Visit        Ade Bob is a 89 y.o. female who is being evaluated via a billable video visit.      The patient has been notified of following:     \"This video visit will be conducted via a call between you and your physician/provider. We have found that certain health care needs can be provided without the need for an in-person physical exam.  This service lets us provide the care you need with a video conversation.  If a prescription is necessary we can send it to the facility team.  If lab work is needed we can place an order through the facility team to have that test done at a later time.    If during the course of the call the physician/provider feels a video visit is not appropriate, you will not be charged for this service.\"     Physician/Provider has received verbal consent for a Video Visit from the patient/family/facility staff on 5/15/2020    HPI statement: Ade Bob is a 89 y.o. female who is being evaluated via a billable video visit by Joshua Ott MD using the face time platform from home office.  There was seen at the residence with facility staff.          Video Start Time: 11:33 AM           [facility]    Facility:  [facility] [[id number]]    Code Status:  DNR    Chief Complaint   Patient presents with     Review Of Multiple Medical Conditions   :                    Patient is seen today for a federally mandated regulatory visit                 Patient Active Problem List   Diagnosis     Back pain     Compression fracture of body of thoracic vertebra (H)     Cachexia (H)     Malnutrition (H)     Severe protein-calorie malnutrition (H)     Hyponatremia     Thoracic compression fracture, closed, initial encounter (H)     Alzheimer's dementia (H)     Advanced directives, counseling/discussion     Closed stable burst fracture of T6 vertebra (H)     Closed " stable burst fracture of T8 vertebra (H)     Metabolic encephalopathy       HPI statement: Ade Bob is a 89 y.o. female who is being evaluated via a billable video visit by Joshua Ott MD using the face time platform from home office.  There was seen at the residence with facility staff.      Time of visit:    Patient/POA/facility staff gave verbal consent to receive medical care via telemedicine on 5/15/2020.         Subjective/review of systems/history:  Ade Bob  is a 89 year old female with history of Alzheimer's dementia, osteoporosis with multiple spinal fractures, hyperlipidemia, malnutrition, vitamin D deficiency seen on 5/15/2020 for a federally mandated regulatory visit and to address concerns noted below in the assessment/plan section of this note.    Recall that she was recently admitted January 28 through January 31 for acute on chronic back pain.  Work-up revealed new T6 compression fracture age-indeterminate T8 fracture and old T10 T12 L3-L4 fractures.  Patient was unable to tolerate TLSO which was taken off and she has done better since that time.    Patient is unable to contribute to history today.  Staff reports that she is been much more comfortable since going off the brace.  She likes to stay in bed and spends most of her day there.  They try to get her up at least once a day and swelling much better than it did earlier on.    Patient's weight has been stable, she is eating.  She seems content to the staff.  She does not cry out in pain.  She has no behavioral issues of concern.  She responds to questions but is hard of hearing.  She initiates little conversation.  She remains on oxycodone 5 mg 3 times daily but it is not clear that she still needs it.  She is also on Tylenol 4 g daily.    There was some consideration of hospice earlier but since she has been clinically stable hospice has not been consulted.    Remainder 13 system ROS negative or unobtainable  including no constipation fever sweats chills cough hypoxia falls injuries    Past Medical History:   Diagnosis Date     Alzheimer's dementia (H)     per family report     Back pain 2017     Dementia (H)      Hyperlipidemia      Past Surgical History:   Procedure Laterality Date     NO PAST SURGERIES            No family history on file.:   Unobtainable, patient unaware    Social History     Socioeconomic History     Marital status:      Spouse name: Not on file     Number of children: Not on file     Years of education: Not on file     Highest education level: Not on file   Occupational History     Not on file   Social Needs     Financial resource strain: Not on file     Food insecurity     Worry: Not on file     Inability: Not on file     Transportation needs     Medical: Not on file     Non-medical: Not on file   Tobacco Use     Smoking status: Former Smoker     Packs/day: 1.00     Last attempt to quit: 1977     Years since quittin.3     Smokeless tobacco: Never Used     Tobacco comment: Quit smoking many years ago   Substance and Sexual Activity     Alcohol use: No     Drug use: No     Sexual activity: Not Currently   Lifestyle     Physical activity     Days per week: Not on file     Minutes per session: Not on file     Stress: Not on file   Relationships     Social connections     Talks on phone: Not on file     Gets together: Not on file     Attends Baptism service: Not on file     Active member of club or organization: Not on file     Attends meetings of clubs or organizations: Not on file     Relationship status: Not on file     Intimate partner violence     Fear of current or ex partner: Not on file     Emotionally abused: Not on file     Physically abused: Not on file     Forced sexual activity: Not on file   Other Topics Concern     Not on file   Social History Narrative     Not on file   :        Current Outpatient Medications   Medication Sig     acetaminophen (TYLENOL) 325 MG  tablet Take 650 mg by mouth see administration instructions. Take 650 three times a day and take 650 daily prn     alendronate (FOSAMAX) 70 MG tablet Take 70 mg by mouth once a week. Saturdays     aluminum-magnesium hydroxide-simethicone (MAALOX ADVANCED) 200-200-20 mg/5 mL Susp Take 30 mL by mouth every 4 (four) hours as needed.     cholecalciferol, vitamin D3, 1,000 unit (25 mcg) tablet Take 2,000 Units by mouth daily.     gabapentin (NEURONTIN) 100 MG capsule Take 100 mg by mouth 3 (three) times a day.     lidocaine (XYLOCAINE) 5 % ointment Apply to mid back tid     meclizine (ANTIVERT) 25 mg tablet Take 25 mg by mouth every 4 (four) hours as needed. Give 1 hour before travel     multivitamin therapeutic tablet Take 1 tablet by mouth daily.     oxyCODONE (ROXICODONE) 5 MG immediate release tablet Take 5 mg by mouth 3 (three) times a day as needed.      polyethylene glycol (MIRALAX) 17 gram packet Take 1 packet (17 g total) by mouth daily.     senna-docusate (PERICOLACE) 8.6-50 mg tablet Take 1 tablet by mouth 2 (two) times a day.     traZODone (DESYREL) 50 MG tablet Take 0.5 tablets (25 mg total) by mouth at bedtime.     acetaminophen (TYLENOL) 500 MG tablet Take 1,000 mg by mouth 4 (four) times a day.   :        Cefdinir and Sulfa (sulfonamide antibiotics)    Vitals:  There were no vitals taken for this visit.                Current Vitals   BP: 91/56 mmHg  5/15/2020 21:39  Temp:97.8  F  5/15/2020 21:39  Pulse: 90 bpm  5/15/2020 21:39  Weight: 79.6 Lbs  5/4/2020 08:24  Resp: 16 Breaths/min  5/15/2020 21:39  BS: 18 mg/dL  4/4/2020 00:43  O2: 91 %  5/15/2020 21:39  Pain: 0  5/15/2020 17:47  There is no height or weight on file to calculate BMI.    Physical exam: If performed was purely observational/via visual confirmation    Observations gathered via telehealth on 5/15/2020    General:  Alert pleasantly confused.  Has difficulty figuring out the video format that were working with today.  Appears comfortable.  No  tachypnea or accessory muscle use.  Head appears normocephalic atraumatic gaze is conjugate sclera clear  Speech clear, she will he says only yes or no during the time and with her  Extemities moving all 4 extremities, no obvious edema  Skin clear in visualized areas      Labs:  Lab Results   Component Value Date    WBC 7.3 02/06/2020    HGB 11.4 (L) 02/06/2020    HCT 35.3 02/06/2020    MCV 99 02/06/2020     02/06/2020     Results for orders placed or performed in visit on 02/06/20   Basic Metabolic Panel   Result Value Ref Range    Sodium 138 136 - 145 mmol/L    Potassium 3.9 3.5 - 5.0 mmol/L    Chloride 100 98 - 107 mmol/L    CO2 33 (H) 22 - 31 mmol/L    Anion Gap, Calculation 5 5 - 18 mmol/L    Glucose 82 70 - 125 mg/dL    Calcium 8.9 8.5 - 10.5 mg/dL    BUN 11 8 - 28 mg/dL    Creatinine 0.46 (L) 0.60 - 1.10 mg/dL    GFR MDRD Af Amer >60 >60 mL/min/1.73m2    GFR MDRD Non Af Amer >60 >60 mL/min/1.73m2         Lab Results   Component Value Date    TSH 1.53 12/05/2016     @LASTA!C@  [unfilled]  No results found for: TNQJEJYQ61  No results found for: BNP  [unfilled]        Invalid input(s): PRINTERVAL      Assessment/Plan:      ICD-10-CM    1. Severe protein-calorie malnutrition (H)  E43    2. Thoracic compression fracture, closed, initial encounter (H)  S22.000A    3. Alzheimer's dementia without behavioral disturbance, unspecified timing of dementia onset (H)  G30.9     F02.80    4. Closed stable burst fracture of sixth thoracic vertebra with routine healing, subsequent encounter  S22.051D    5. Metabolic encephalopathy  G93.41        Osteoporosis  New T6 compression fracture  Multiple old compression fractures as noted above  Vitamin D deficiency   Patient continues on multivitamin, vitamin D replacement.  The acute pain seems to have improved greatly.  Discussed options with nursing staff of gradual dosage reduction versus changing it to as needed.  We agreed to change oxycodone to 5 mg every 8 as  needed.  We will see how often she needs it and consider decreasing it to 2.5 as needed and then perhaps off.  Meanwhile she is been taking maximum dose Tylenol for many weeks.  She is rather slightly in size.  I would recommend and ordered it decreased to 650 3 times daily plus an additional 650 mg once daily on a as needed basis.  TLSO has been discontinued.  Staff reports that she ambulates to the bathroom without apparent pain.    Cachexia   Weight has been stable.  Staff reports that she eats pretty well actually.  No more lab work is anticipated.  Comfort care focus remains primary care      Case discussed with:    Facility staff       Video-Visit Details    Type of service:  Video Visit    Video End Time (time video stopped): 11:38 AM    Originating Location (pt. Location):[facility] [[id number]]    Distant Location (provider location):  [facility]     Mode of Communication:  FaceTime video conference        Joshua Ott MD  Phone/video time spent with patient discussing health concerns, > 50% on counseling and coordination of care.      Joshua Ott MD

## 2021-06-11 NOTE — PROGRESS NOTES
[facility]      NAME:  Ade Bob             :  10/23/1927    MRN: [id number]    CODE STATUS:  DNR    FACILITY: [facility] [[id number]]         CHIEF COMPLAIN/REASON FOR VISIT:  Chief Complaint   Patient presents with     FVP Care Coordination - Regulatory       HISTORY OF PRESENT ILLNESS: Ade Bob is a 89 y.o. female being seen today for Regulatory visit for [facility]. She lives in a memory care unit in a LTC facility.     Frail elderly with dementia, malnutrition with current weight at 81 pounds.  She has had several spine fractures and upper back pain which she uses a lidocaine patch for.  Today she is lying comfortably does not appear to be in any distress or pain.  She is smiling and pleasant but does not respond to ROS questions.  Staff report no behavior issues.  She is often up to eat however she quickly wants to go back to bed.  She has no open areas or skin breakdown that are we are aware of.      Allergies   Allergen Reactions     Cefdinir Nausea Only     Sulfa (Sulfonamide Antibiotics) Unknown and Hives   :     Current Outpatient Medications   Medication Sig     acetaminophen (TYLENOL) 325 MG tablet Take 650 mg by mouth see administration instructions. Take 650 three times a day and take 650 daily prn     acetaminophen (TYLENOL) 500 MG tablet Take 1,000 mg by mouth 4 (four) times a day.     alendronate (FOSAMAX) 70 MG tablet Take 70 mg by mouth once a week.      aluminum-magnesium hydroxide-simethicone (MAALOX ADVANCED) 200-200-20 mg/5 mL Susp Take 30 mL by mouth every 4 (four) hours as needed.     cholecalciferol, vitamin D3, 1,000 unit (25 mcg) tablet Take 2,000 Units by mouth daily.     gabapentin (NEURONTIN) 100 MG capsule Take 100 mg by mouth 3 (three) times a day.     lidocaine (XYLOCAINE) 5 % ointment Apply to mid back tid     meclizine (ANTIVERT) 25 mg tablet Take 25 mg by mouth every 4 (four) hours as  needed. Give 1 hour before travel     mirtazapine (REMERON) 7.5 MG tablet Take 7.5 mg by mouth at bedtime.     multivitamin therapeutic tablet Take 1 tablet by mouth daily.     oxyCODONE (ROXICODONE) 5 MG immediate release tablet Take 5 mg by mouth 3 (three) times a day as needed.      polyethylene glycol (MIRALAX) 17 gram packet Take 1 packet (17 g total) by mouth daily.     senna-docusate (PERICOLACE) 8.6-50 mg tablet Take 1 tablet by mouth 2 (two) times a day.     traZODone (DESYREL) 50 MG tablet Take 0.5 tablets (25 mg total) by mouth at bedtime.         REVIEW OF SYSTEMS:    Due to advanced Alzheimer's unable to participate in ROS.      PHYSICAL EXAMINATION:  Vitals:    09/01/20 0925   BP: 105/57   Pulse: 88   Temp: 98  F (36.7  C)   Weight: (!) 88 lb (39.9 kg)         GENERAL: Awake, Alert, oriented TO SELF, not in any form of acute distress, unable to answers questions appropriately,commands, conversant  HEENT: Head is normocephalic with normal hair distribution. No evidence of trauma. Ears: No acute purulent discharge. Eyes: Conjunctivae pink with no scleral jaundice. Nose: Normal mucosa and septum. NECK: Supple with no cervical or supraclavicular lymphadenopathy. Trachea is midline. Poor dental , broken teeth but clean oral cavity  CHEST: No tenderness or deformity, no crepitus  LUNG: Clear to auscultation with good chest expansion. There are no crackles or wheezes, normal AP diameter.  BACK: No kyphosis of the thoracic spine. Symmetric, no curvature, ROM normal, no CVA tenderness, no spinal tenderness   CVS: There is good S1  S2, rhythm is regular.  ABDOMEN: Globular and soft, nontender to palpation, non distended, no masses, no organomegaly, good bowel sounds, no rebound or guarding, no peritoneal signs.   EXTREMITIES: Atraumatic. Full range of motion on both upper and lower extremities, dependant on staff for mobility and uses wc when OOB  SKIN: Warm and dry, no erythema noted, no rashes or  lesions.  NEUROLOGICAL: The patient is oriented to person, [facility]        LABS:    Lab Results   Component Value Date    WBC 7.3 02/06/2020    HGB 11.4 (L) 02/06/2020    HCT 35.3 02/06/2020    MCV 99 02/06/2020     02/06/2020       Results for orders placed or performed in visit on 02/06/20   Basic Metabolic Panel   Result Value Ref Range    Sodium 138 136 - 145 mmol/L    Potassium 3.9 3.5 - 5.0 mmol/L    Chloride 100 98 - 107 mmol/L    CO2 33 (H) 22 - 31 mmol/L    Anion Gap, Calculation 5 5 - 18 mmol/L    Glucose 82 70 - 125 mg/dL    Calcium 8.9 8.5 - 10.5 mg/dL    BUN 11 8 - 28 mg/dL    Creatinine 0.46 (L) 0.60 - 1.10 mg/dL    GFR MDRD Af Amer >60 >60 mL/min/1.73m2    GFR MDRD Non Af Amer >60 >60 mL/min/1.73m2           No results found for: HGBA1C  Vitamin D, Total (25-Hydroxy)   Date Value Ref Range Status   02/04/2020 43.2 30.0 - 80.0 ng/mL Final     No results found for: [id number]    ASSESSMENT/PLAN:  1. Alzheimer's dementia without behavioral disturbance, unspecified timing of dementia onset (H)    2. Severe protein-calorie malnutrition (H)    3. Acute midline thoracic back pain      Dementia: does not participate in discussion although is smiling at my presence. She appears comfortable. Is on locked care unit. Needs met through total assist of care.     Severe protein calorie malnutrition: Weight remains at 81lbs. Continues on remeron.     Back pain: continues lidocaine patch and tylenol.     Case Management:     I have reviewed the facility care plan/MDS which was done quarterly.  including the falls risk, nutrition and pain screening. I also reviewed the current immunizations, and preventive care.. Future cancer screening is not clinically indicated secondary to age/goals of care.   Patient's desire to return to the community is not assessible due to her desire to Alzheimer's advancement  Advance Directive : DNR, pr staff and medical records, pt cannot participate in discussion  I reviewed  the current advanced directives as reflected at  the facility chart.   .  Team Discussion:  I communicated with the appropriate disciplines involved with the Plan of Care:   Nursing       Patient Goal:  Patient's goal is comfort focus and DNR     Information reviewed:  Medications, vital signs, orders, and nursing notes.         Electronically signed by:  Dalton Todd CNP  This progress note was completed using Dragon software and there may be grammatical errors.

## 2021-06-13 NOTE — PROGRESS NOTES
"         [facility]    Facility:  [facility] [[id number]]    Code Status:  DNR    Chief Complaint   Patient presents with     Review Of Multiple Medical Conditions   :                    Patient is seen today for a federally mandated regulatory visit                 Patient Active Problem List   Diagnosis     Back pain     Compression fracture of body of thoracic vertebra (H)     Cachexia (H)     Malnutrition (H)     Severe protein-calorie malnutrition (H)     Hyponatremia     Thoracic compression fracture, closed, initial encounter (H)     Alzheimer's dementia (H)     Advanced directives, counseling/discussion     Closed stable burst fracture of T6 vertebra (H)     Closed stable burst fracture of T8 vertebra (H)     Metabolic encephalopathy           Subjective/review of systems/history:  Ade Bob  is a 89 year old female with history of Alzheimer's dementia, osteoporosis with multiple spinal fractures, hyperlipidemia, malnutrition, vitamin D deficiency          Most recent hospital admission: January 28 through January 31 for acute on chronic back pain.  Work-up revealed new T6 compression fracture age-indeterminate T8 fracture and old T10 T12 L3-L4 fractures.  Patient was unable to tolerate TLSO.        History is obtained primarily from the staff.  They report that the patient eats \"almost nothing\" but that she really likes the supplement that she is given 3 times a day and always drinks that fully.  She also has a Magic cup type supplement which she does not like as well.  Dietitian has been involved in the past with her.  Mirtazapine was added and continued at 7.5 daily.  Despite the poor overall eating, her weight has been stable.  Her bowels have been working.  Staff says she has been quite stable without any behaviors.  She can generally make her wishes known.  Her sleep has improved with the trazodone.  She has tapered off the oxycodone and still has " lidocaine and acetaminophen as needed but staff reports she does not complain of back pain anymore.  She is tolerating the Fosamax.  She still on gabapentin 100 mg 3 times daily as well.  Remainder negative or unobtainable        Remainder 13 system ROS negative or unobtainable including no constipation fever sweats chills cough hypoxia falls injuries    Past Medical History:   Diagnosis Date     Alzheimer's dementia (H)     per family report     Back pain 2017     Dementia (H)      Hyperlipidemia      Past Surgical History:   Procedure Laterality Date     NO PAST SURGERIES            No family history on file.:   Unobtainable, patient unaware    Social History     Socioeconomic History     Marital status:      Spouse name: Not on file     Number of children: Not on file     Years of education: Not on file     Highest education level: Not on file   Occupational History     Not on file   Social Needs     Financial resource strain: Not on file     Food insecurity     Worry: Not on file     Inability: Not on file     Transportation needs     Medical: Not on file     Non-medical: Not on file   Tobacco Use     Smoking status: Former Smoker     Packs/day: 1.00     Quit date: 1977     Years since quittin.9     Smokeless tobacco: Never Used     Tobacco comment: Quit smoking many years ago   Substance and Sexual Activity     Alcohol use: No     Drug use: No     Sexual activity: Not Currently   Lifestyle     Physical activity     Days per week: Not on file     Minutes per session: Not on file     Stress: Not on file   Relationships     Social connections     Talks on phone: Not on file     Gets together: Not on file     Attends Congregational service: Not on file     Active member of club or organization: Not on file     Attends meetings of clubs or organizations: Not on file     Relationship status: Not on file     Intimate partner violence     Fear of current or ex partner: Not on file     Emotionally  abused: Not on file     Physically abused: Not on file     Forced sexual activity: Not on file   Other Topics Concern     Not on file   Social History Narrative     Not on file   :        Current Outpatient Medications   Medication Sig     acetaminophen (TYLENOL) 325 MG tablet Take 650 mg by mouth see administration instructions. Take 650 three times a day and take 650 daily prn     acetaminophen (TYLENOL) 500 MG tablet Take 1,000 mg by mouth 4 (four) times a day.     alendronate (FOSAMAX) 70 MG tablet Take 70 mg by mouth once a week. Saturdays     cholecalciferol, vitamin D3, 1,000 unit (25 mcg) tablet Take 2,000 Units by mouth daily.     gabapentin (NEURONTIN) 100 MG capsule Take 100 mg by mouth 3 (three) times a day.     lidocaine (XYLOCAINE) 5 % ointment Apply to mid back tid     mirtazapine (REMERON) 7.5 MG tablet Take 7.5 mg by mouth at bedtime.     multivitamin therapeutic tablet Take 1 tablet by mouth daily.     polyethylene glycol (MIRALAX) 17 gram packet Take 1 packet (17 g total) by mouth daily.     senna-docusate (PERICOLACE) 8.6-50 mg tablet Take 1 tablet by mouth 2 (two) times a day.     traZODone (DESYREL) 50 MG tablet Take 0.5 tablets (25 mg total) by mouth at bedtime.     aluminum-magnesium hydroxide-simethicone (MAALOX ADVANCED) 200-200-20 mg/5 mL Susp Take 30 mL by mouth every 4 (four) hours as needed.   :        Cefdinir and Sulfa (sulfonamide antibiotics)    Vitals: Blood pressure 92/74 respirations 20 temperature 97.8 heart rate 86 O2 sats 91% weight is 83 pounds and stable  Physical exam: If performed was purely observational/via visual confirmation    Patient is alert sitting up at 45 degrees in bed eating lunch with the help of staff.  She can hold her own glass and drink fluids though with some spillage.  Otherwise staff is managing situation.  Patient is able to ask simple questions for example requests tissues for her nose.  She has purposeful movement of all 4 extremities.  She has clear  sclera with conjugate gaze.  She is inattentive to conversation.  She is not edematous.  Skin is intact in visualized areas.    Due to the 2020 Covid 19 pandemic, the patient was visually observed at a 6 foot plus distance.  An observational exam was performed in an effort to keep patient safe from Covid 19 and other communicable diseases.      Labs:  Lab Results   Component Value Date    WBC 7.3 02/06/2020    HGB 11.4 (L) 02/06/2020    HCT 35.3 02/06/2020    MCV 99 02/06/2020     02/06/2020     Results for orders placed or performed in visit on 02/06/20   Basic Metabolic Panel   Result Value Ref Range    Sodium 138 136 - 145 mmol/L    Potassium 3.9 3.5 - 5.0 mmol/L    Chloride 100 98 - 107 mmol/L    CO2 33 (H) 22 - 31 mmol/L    Anion Gap, Calculation 5 5 - 18 mmol/L    Glucose 82 70 - 125 mg/dL    Calcium 8.9 8.5 - 10.5 mg/dL    BUN 11 8 - 28 mg/dL    Creatinine 0.46 (L) 0.60 - 1.10 mg/dL    GFR MDRD Af Amer >60 >60 mL/min/1.73m2    GFR MDRD Non Af Amer >60 >60 mL/min/1.73m2         Lab Results   Component Value Date    TSH 1.53 12/05/2016     @LASTA!C@  [unfilled]  No results found for: [id number]  No results found for: BNP  [unfilled]        Invalid input(s): PRINTERVAL      Assessment/Plan:      ICD-10-CM    1. Compression fracture of body of thoracic vertebra (H)  S22.000A        Osteoporosis  New T6 compression fracture January 2020  Multiple old compression fractures as noted above  Vitamin D deficiency   Patient's pain has improved greatly.  She is no longer needing opiates and staff says she complains rarely of any discomfort.  They say she moves without apparent discomfort.  Patient continues on Fosamax (and remains ambulatory), multivitamin, calcium, vitamin D replacement.  The acute pain seems to have improved greatly.  Recall that she never really did tolerate the TLSO.  She has acetaminophen and lidocaine and takes gabapentin 100 3 times daily.  Falls precautions are appropriate.      I am  not sure were going to continue blood work in the long-term but I did order BMP CBC TSH today    Cachexia   She has stabilized with her weight being on mirtazapine 7.5 daily which is continued.  She eats little but likes her supplements which are keeping her going.  Comfort care focus remains primary goal here    .    Alzheimer's dementia   No behavioral disturbances.  Severe disease without medication therapy    Constipation     Stable on bowel medications no changes            Case discussed with:    Facility staff       Joshua Ott MD

## 2021-06-14 NOTE — TELEPHONE ENCOUNTER
This patient's medication list and chart were reviewed as part of the service provided by [facility].    Assessment/Recommendations:  1. (Osteoporosis):  Noted h/o fractures, however, with patient's declining renal function, likely time to d'c Alendronate.  Med should be avoided when CrCl<35ml/min, and pt with CrCl 37ml/min.  Consider d'c Alendronate, and ensure adequate calcium intake (1200mg elemental calcium daily via diet + supplements).  Is not on a calcium supplement, and depending on dietary calcium intake, may benefit from addition of calcium supplement.  2. (Insomnia):  Appears Mirtazapine was started after Trazodone.  Mirtazapine is likely offering benefit for sleep, and pt may no longer need Trazodone.  Consider changing Trazodone from scheduled to prn, and if pt continuing to sleep ok, d'c Trazodone altogether.  Trazodone may contribute to orthostasis, dizziness, falls, confusion, etc.  Reducing meds where able may be helpful for reducing risk of falls.  3. (Pain):  Noted on chart review that pt is no longer complaining of pain.  Consider reduction in Gabapentin to 100mg twice daily and monitor.  If tolerates reduction, consider further taper and d'c.  Gabapentin may also contribute to sedation, dizziness, falls, confusion, etc.    Est CrCl ([place]ro[place]-Gault) = 37ml/min (based on Scr 0.43 & actual body weight 39.9kg)    Yesica Nath, Pharm.D.,[facility]  Board Certified Geriatric Pharmacist  Medication Therapy Management Pharmacist  [phone]

## 2021-06-14 NOTE — PROGRESS NOTES
[facility]      NAME:  Ade Bob             :  10/23/1927    MRN: [id number]    CODE STATUS:  DNR    FACILITY: [facility] [[id number]]         CHIEF COMPLAIN/REASON FOR VISIT:  Chief Complaint   Patient presents with     [facility] Care Coordination - Regulatory       HISTORY OF PRESENT ILLNESS: Ade Bob is a 89 y.o. female being seen today for Regulatory visit for [facility]. She lives in a memory care unit in a LTC facility.     Frail, elderly 89-year-old female with dementia.  Malnutrition with current weight now at 87 pounds which is an improvement from the last regulatory visit at 81 pounds.  She is using lidocaine patch for pain and denies any distress or discomfort today.  She does not respond to any review of systems but will appears comfortable and seems to enjoy in the presence.  Staff denies any concerns.    Frail elderly with dementia, malnutrition with current weight at 81 pounds.  She has had several spine fractures and upper back pain which she uses a lidocaine patch for.  Today she is lying comfortably does not appear to be in any distress or pain.  She is smiling and pleasant but does not respond to ROS questions.  Staff report no behavior issues.  She is often up to eat however she quickly wants to go back to bed.  She has no open areas or skin breakdown that are we are aware of.      Allergies   Allergen Reactions     Cefdinir Nausea Only     Sulfa (Sulfonamide Antibiotics) Unknown and Hives   :     Current Outpatient Medications   Medication Sig     acetaminophen (TYLENOL) 325 MG tablet Take 650 mg by mouth 3 (three) times a day.      alendronate (FOSAMAX) 70 MG tablet Take 70 mg by mouth once a week.      cholecalciferol, vitamin D3, 1,000 unit (25 mcg) tablet Take 2,000 Units by mouth daily.     gabapentin (NEURONTIN) 100 MG capsule Take 100 mg by mouth 3 (three) times a day.     lidocaine (LIDODERM) 5 %  "Place 1 patch on the skin daily. Remove & Discard patch within 12 hours or as directed by MD. Apply to mid back     mirtazapine (REMERON) 7.5 MG tablet Take 7.5 mg by mouth at bedtime.     multivitamin therapeutic tablet Take 1 tablet by mouth daily.     polyethylene glycol (MIRALAX) 17 gram packet Take 1 packet (17 g total) by mouth daily.     senna-docusate (PERICOLACE) 8.6-50 mg tablet Take 1 tablet by mouth 2 (two) times a day.     traZODone (DESYREL) 50 MG tablet Take 0.5 tablets (25 mg total) by mouth at bedtime.         REVIEW OF SYSTEMS:    Due to advanced Alzheimer's unable to participate in ROS.      PHYSICAL EXAMINATION:  Vitals:    01/21/21 1021   BP: 110/81   Pulse: 85   Resp: 20   Temp: 97.3  F (36.3  C)   SpO2: 91%   Weight: (!) 87 lb (39.5 kg)   Height: 5' 4\" (1.626 m)         GENERAL: Awake, Alert, oriented TO SELF, not in any form of acute distress, unable to answers questions appropriately,commands, conversant  HEENT: Head is normocephalic with normal hair distribution. No evidence of trauma. Ears: No acute purulent discharge. Eyes: Conjunctivae pink with no scleral jaundice. Nose: Normal mucosa and septum. NECK: Supple with no cervical or supraclavicular lymphadenopathy. Trachea is midline. Poor dental , broken teeth but clean oral cavity  CHEST: No tenderness or deformity, no crepitus  LUNG: Clear to auscultation with good chest expansion. There are no crackles or wheezes, normal AP diameter.  BACK: No kyphosis of the thoracic spine. Symmetric, no curvature, ROM normal, no CVA tenderness, no spinal tenderness   CVS: There is good S1  S2, rhythm is regular.  ABDOMEN: Globular and soft, nontender to palpation, non distended, no masses, no organomegaly, good bowel sounds, no rebound or guarding, no peritoneal signs.   EXTREMITIES: Atraumatic. Full range of motion on both upper and lower extremities, dependant on staff for mobility and uses wc when OOB  SKIN: Warm and dry, no erythema noted, no " rashes or lesions.  NEUROLOGICAL: The patient is oriented to person, ADVANCED DEMENTIA        LABS:    Lab Results   Component Value Date    WBC 7.3 02/06/2020    HGB 11.4 (L) 02/06/2020    HCT 35.3 02/06/2020    MCV 99 02/06/2020     02/06/2020       Results for orders placed or performed in visit on 02/06/20   Basic Metabolic Panel   Result Value Ref Range    Sodium 138 136 - 145 mmol/L    Potassium 3.9 3.5 - 5.0 mmol/L    Chloride 100 98 - 107 mmol/L    CO2 33 (H) 22 - 31 mmol/L    Anion Gap, Calculation 5 5 - 18 mmol/L    Glucose 82 70 - 125 mg/dL    Calcium 8.9 8.5 - 10.5 mg/dL    BUN 11 8 - 28 mg/dL    Creatinine 0.46 (L) 0.60 - 1.10 mg/dL    GFR MDRD Af Amer >60 >60 mL/min/1.73m2    GFR MDRD Non Af Amer >60 >60 mL/min/1.73m2           No results found for: HGBA1C  Vitamin D, Total (25-Hydroxy)   Date Value Ref Range Status   02/04/2020 43.2 30.0 - 80.0 ng/mL Final     No results found for: [id number]    ASSESSMENT/PLAN:  1. Alzheimer's dementia without behavioral disturbance, unspecified timing of dementia onset (H)    2. Thoracic compression fracture, closed, initial encounter (H)    3. Severe protein-calorie malnutrition (H)    4. Acute midline thoracic back pain      Dementia: advanced, severe. Minimal conversation but does smile and appreciate my visit.     Severe protein calorie malnutrition: Weight improved now 87 pounds.  Up from 81 pounds.    -She remains on Remeron.   -Multivitamin daily.    Back pain: continues lidocaine patch and tylenol.  Has been started on Fosamax.  -Gabapentin 100 mg p.o. daily.  -Fosamax every week at Saturday.    Case Management:     I have reviewed the facility care plan/MDS which was done quarterly.  including the falls risk, nutrition and pain screening. I also reviewed the current immunizations, and preventive care.. Future cancer screening is not clinically indicated secondary to age/goals of care.   Patient's desire to return to the community is not assessible  due to her desire to Alzheimer's advancement  Advance Directive : DNR, per staff and medical records, pt cannot participate in discussion  I reviewed the current advanced directives as reflected at  the facility chart.   .  Team Discussion:  I communicated with the appropriate disciplines involved with the Plan of Care:   Nursing       Patient Goal:  Patient's goal is comfort focus and DNR     Information reviewed:  Medications, vital signs, orders, and nursing notes.         Electronically signed by:  Dalton Todd CNP  This progress note was completed using Dragon software and there may be grammatical errors.

## 2021-06-15 NOTE — TELEPHONE ENCOUNTER
[facility] Nurse Triage Telephone Note      Provider: DEEPTHI Deleon  Facility: [facility]    Facility Type: [facility]    Caller: Eleanor  Call Back Number:  [phone]    Allergies: Cefdinir and Sulfa (sulfonamide antibiotics)    Reason for call: Nurse calling to report pt experiencing fatigue and unresponsiveness. Somewhat responsive to stimuli. Pt also experiencing hypotension and hypoxia. Breathing is non labored however shortness of breath noted. Pt DNR and received order for hospice eval earlier today 3/8.     Vitals: BP:  66/42  P:: 107  R:: 24  SPO2: 82-87% R/A Temp.:  97.6  Wt: 87.8       Verbal Order/Direction given by Provider: - Initiate and titrate supplemental O2 at 2 L/min via nasal cannula prn for dyspnea, hypoxia (O2 saturation < 88%). Morphine 2.5mg solutab 1 tablet q2hrs PRN for shortness of breath/pain. STAT hospice eval    Provider giving order: DEEPTHI Deleon    Verbal order given to: Eleanor Betts RN

## 2021-06-15 NOTE — PROGRESS NOTES
"         [facility]    Facility:  [facility] [[id number]]    Code Status:  DNR    Chief Complaint   Patient presents with     Problem Visit     diarrhea    :                                     Patient Active Problem List   Diagnosis     Back pain     Compression fracture of body of thoracic vertebra (H)     Cachexia (H)     Malnutrition (H)     Severe protein-calorie malnutrition (H)     Hyponatremia     Thoracic compression fracture, closed, initial encounter (H)     Alzheimer's dementia (H)     Advanced directives, counseling/discussion     Closed stable burst fracture of T6 vertebra (H)     Closed stable burst fracture of T8 vertebra (H)     Metabolic encephalopathy     Diarrhea           Subjective/review of systems/history:  Ade Bob  is a 89 year old female with history of Alzheimer's dementia, osteoporosis with multiple spinal fractures, hyperlipidemia, malnutrition, vitamin D deficiency          Most recent hospital admission: January 28 through January 31 for acute on chronic back pain.  Work-up revealed new T6 compression fracture age-indeterminate T8 fracture and old T10 T12 L3-L4 fractures.  Patient was unable to tolerate TLSO.        Subjective/review of systems:    -In visiting the unit today, staff informs me that the patient started to have diarrhea yesterday afternoon.  She has had 7 loose stools in the last 24 hours.  She has had decreased appetite but has been able to take fluids.  She has not had fever sweats or chills.  There have been no respiratory symptoms.  They did send a rapid Covid test which came back negative.  There are no Covid cases and the staff or patients on the unit.  There is no 1 else on the unit with viral gastroenteritis symptoms.    Patient has been able to take her medications without vomiting.    Patient is unable to contribute much to the history.  She just knows \"I do not feel good today\".  She seems uncertain but " reports no belly pain flank pain dysuria.  Staff has noted no hematuria.  There is been no melena or bright red blood.  She has had no vomiting.  No known falls or injuries.  Remainder negative or unobtainable      Past Medical History:   Diagnosis Date     Alzheimer's dementia (H)     per family report     Back pain 2017     Dementia (H)      Hyperlipidemia      Past Surgical History:   Procedure Laterality Date     NO PAST SURGERIES            No family history on file.:   Unobtainable, patient unaware    Social History     Socioeconomic History     Marital status:      Spouse name: Not on file     Number of children: Not on file     Years of education: Not on file     Highest education level: Not on file   Occupational History     Not on file   Social Needs     Financial resource strain: Not on file     Food insecurity     Worry: Not on file     Inability: Not on file     Transportation needs     Medical: Not on file     Non-medical: Not on file   Tobacco Use     Smoking status: Former Smoker     Packs/day: 1.00     Quit date: 1977     Years since quittin.1     Smokeless tobacco: Never Used     Tobacco comment: Quit smoking many years ago   Substance and Sexual Activity     Alcohol use: No     Drug use: No     Sexual activity: Not Currently   Lifestyle     Physical activity     Days per week: Not on file     Minutes per session: Not on file     Stress: Not on file   Relationships     Social connections     Talks on phone: Not on file     Gets together: Not on file     Attends Worship service: Not on file     Active member of club or organization: Not on file     Attends meetings of clubs or organizations: Not on file     Relationship status: Not on file     Intimate partner violence     Fear of current or ex partner: Not on file     Emotionally abused: Not on file     Physically abused: Not on file     Forced sexual activity: Not on file   Other Topics Concern     Not on file   Social  History Narrative     Not on file   :        Current Outpatient Medications   Medication Sig     acetaminophen (TYLENOL) 325 MG tablet Take 650 mg by mouth 3 (three) times a day.      alendronate (FOSAMAX) 70 MG tablet Take 70 mg by mouth once a week. Saturdays     cholecalciferol, vitamin D3, 1,000 unit (25 mcg) tablet Take 2,000 Units by mouth daily.     gabapentin (NEURONTIN) 100 MG capsule Take 100 mg by mouth 3 (three) times a day.     lidocaine (LIDODERM) 5 % Place 1 patch on the skin daily. Remove & Discard patch within 12 hours or as directed by MD. Apply to mid back     mirtazapine (REMERON) 7.5 MG tablet Take 7.5 mg by mouth at bedtime.     multivitamin therapeutic tablet Take 1 tablet by mouth daily.     polyethylene glycol (MIRALAX) 17 gram packet Take 1 packet (17 g total) by mouth daily.     senna-docusate (PERICOLACE) 8.6-50 mg tablet Take 1 tablet by mouth 2 (two) times a day.     traZODone (DESYREL) 50 MG tablet Take 0.5 tablets (25 mg total) by mouth at bedtime.   :        Cefdinir and Sulfa (sulfonamide antibiotics)    Vitals:   Vitals:    02/26/21 1626   BP: 110/81   Pulse: 83   Resp: 20   Temp: 98  F (36.7  C)   SpO2: 90%     Weight is stable at 88 pounds  Physical exam:     Patient is lying in bed sleeping.  I wake her.  She alerts and is inattentive, looks tired.  She is quite thin but her usual self.  Normocephalic atraumatic sclera clear gaze is conjugate speech is clear she is breathing comfortably without accessory muscle use or tachypnea her lungs are clear to auscultation.  Pulses are strong with good perfusion throughout with rates near 80.  Her belly does have normal bowel sounds and is soft.  No involuntary guarding.  No organomegaly or mass noted.  No CVA tenderness.  Extremities without edema.  Skin intact in visualized areas         Due to the 2020 Covid 19 pandemic, the patient was visually observed at a 6 foot plus distance.  An observational exam was performed in an effort to  "keep patient safe from Covid 19 and other communicable diseases.      Labs:  Lab Results   Component Value Date    WBC 7.3 02/06/2020    HGB 11.4 (L) 02/06/2020    HCT 35.3 02/06/2020    MCV 99 02/06/2020     02/06/2020     Results for orders placed or performed in visit on 02/06/20   Basic Metabolic Panel   Result Value Ref Range    Sodium 138 136 - 145 mmol/L    Potassium 3.9 3.5 - 5.0 mmol/L    Chloride 100 98 - 107 mmol/L    CO2 33 (H) 22 - 31 mmol/L    Anion Gap, Calculation 5 5 - 18 mmol/L    Glucose 82 70 - 125 mg/dL    Calcium 8.9 8.5 - 10.5 mg/dL    BUN 11 8 - 28 mg/dL    Creatinine 0.46 (L) 0.60 - 1.10 mg/dL    GFR MDRD Af Amer >60 >60 mL/min/1.73m2    GFR MDRD Non Af Amer >60 >60 mL/min/1.73m2         Lab Results   Component Value Date    TSH 1.53 12/05/2016     @LASTA!C@  [unfilled]  No results found for: [id number]  No results found for: BNP  [unfilled]        Invalid input(s): PRINTERVAL      Assessment/Plan:      ICD-10-CM    1. Acute midline thoracic back pain  M54.6    2. Compression fracture of body of thoracic vertebra (H)  S22.000A    3. Diarrhea, unspecified type  R19.7        Loose stool    Patient has had 7 loose stools in 24 hours without any vomiting.  She has had decreased appetite but has been taking liquids.  No apparent infectious issues on the dementia unit at this time.    Wide differential with infectious etiologies always near the top of the list.  However I think it is too early to launch into a broad work-up, especially since she lacks any \"warning signs\" such as vomiting/dehydration, fever belly tenderness melena.  However that decision could easily change if her loose stools continue, or become accompanied by other symptoms such as vomiting fever belly pain.  Recall also that patient has a comfort care goal, and if worsens we will need to discuss options with her family members.  I discussed this with nurse manager of the unit.  -Push fluids  -As long as she is not " vomiting I think is okay to let her eat what she wants to  -She is not complaining of nausea now but could add Zofran if need be  -If patient worsens she will need reevaluation      osteoporosis  New T6 compression fracture January 2020  Multiple old compression fractures as noted above  Vitamin D deficiency   Patient has stabilized from this standpoint.   She is no longer needing opiates and staff says she complains rarely of any discomfort. Patient continues on Fosamax (and remains ambulatory), multivitamin, calcium, vitamin D replacement.   Recall that she never really did tolerate the TLSO.  She has acetaminophen and lidocaine and takes gabapentin 100 3 times daily.  Falls precautions are appropriate.      Cachexia   She has stabilized with her weight being on mirtazapine 7.5 daily which is continued.  She eats little but likes her supplements which are keeping her going.  Comfort care focus remains primary goal here    .    Alzheimer's dementia   No behavioral disturbances.  Severe disease without medication therapy    Constipation     Bowel medications are on hold of course, discussed with nurse manager.  She usually takes senna docusate and MiraLAX on a scheduled basis            Case discussed with:    Facility staff       Joshua Ott MD

## 2021-06-15 NOTE — TELEPHONE ENCOUNTER
[facility] Nurse Triage Telephone Note      Provider: DEEPTHI Deleon  Facility: [facility]    Facility Type: LTC    Caller: Eleanor  Call Back Number:  [phone]    Allergies: Cefdinir and Sulfa (sulfonamide antibiotics)    Reason for call: Nurse calling to request referral for hospice to evaluate. Reports poor meal intake approx. 0-15%. Noted pt to be weaker and requiring extensive/max assistance with ADLs, transfers, mobility. Weight has been consistent around 87.8#. Pt has chronic back pain.      Verbal Order/Direction given by Provider: SO: - Start in house hospice if requested by facility staff or family.    Provider giving order: DEEPTHI Deleon    Verbal order given to: Eleanor Betts RN

## 2021-06-15 NOTE — PROGRESS NOTES
[facility]      NAME:  Ade Bob             :  10/23/1927    MRN: [id number]    CODE STATUS:  DNR    FACILITY: [facility] [[id number]]         CHIEF COMPLAIN/REASON FOR VISIT:  Chief Complaint   Patient presents with     Problem Visit     Sharp decline in status, hospice initiated.       HISTORY OF PRESENT ILLNESS: Ade Bob is a 89 y.o. female being seen today for Regulatory visit for [facility]. She lives in a memory care unit in a LTC facility.     Frail, elderly 89-year-old female with dementia.  Malnutrition with current weight now at 87 pounds which is an improvement from the last regulatory visit at 81 pounds.  She is using lidocaine patch for pain and denies any distress or discomfort today.  She does not respond to any review of systems but will appears comfortable and seems to enjoy in the presence.  Staff denies any concerns.    Today: Patient has been signed on with [facility] after decreased  responsiveness yesterday. She is seen resting in bed and appears comfortable. Family  members are at bedside, and they tell me that additional family members are arriving  soon. She is minimally responsive on exam. Family report she was somewhat agitated  earlier, removing oxygen tubing. Per the advise of her Hospice caregivers, this was  removed for comfort and patient has been resting peacefully since. Nursing report  morphine and ativan have been effective for relief of symptoms. She is unable to  participate in ROS but family are satisfied with her comfort and graciously note that they  are grateful for care she has received and to be present today despite the usual  pandemic visiting restrictions.      Allergies   Allergen Reactions     Cefdinir Nausea Only     Sulfa (Sulfonamide Antibiotics) Unknown and Hives   :     Current Outpatient Medications   Medication Sig     acetaminophen (TYLENOL) 325 MG tablet Take 650 mg  by mouth 3 (three) times a day.      alendronate (FOSAMAX) 70 MG tablet Take 70 mg by mouth once a week. Saturdays     cholecalciferol, vitamin D3, 1,000 unit (25 mcg) tablet Take 2,000 Units by mouth daily.     gabapentin (NEURONTIN) 100 MG capsule Take 100 mg by mouth 3 (three) times a day.     lidocaine (LIDODERM) 5 % Place 1 patch on the skin daily. Remove & Discard patch within 12 hours or as directed by MD. Apply to mid back     mirtazapine (REMERON) 7.5 MG tablet Take 7.5 mg by mouth at bedtime.     morphine 2.5 MG solutab Place 2.5 mg under the tongue every 2 (two) hours as needed for pain (SOB/pain).     multivitamin therapeutic tablet Take 1 tablet by mouth daily.     polyethylene glycol (MIRALAX) 17 gram packet Take 1 packet (17 g total) by mouth daily.     senna-docusate (PERICOLACE) 8.6-50 mg tablet Take 1 tablet by mouth 2 (two) times a day.     traZODone (DESYREL) 50 MG tablet Take 0.5 tablets (25 mg total) by mouth at bedtime.         REVIEW OF SYSTEMS:    Due to advanced Alzheimer's unable to participate in ROS.      PHYSICAL EXAMINATION:  Vitals:    03/09/21 1137   BP: (!) 64/42   Pulse: (!) 109   Resp: 18   Temp: 99  F (37.2  C)   SpO2: 90%   Weight: (!) 87 lb 12.8 oz (39.8 kg)         GENERAL: Awake, Alert, oriented TO SELF, not in any form of acute distress, unable to answers questions appropriately,commands, conversant  HEENT: Head is normocephalic with normal hair distribution. No evidence of trauma. Ears: No acute purulent discharge. Eyes: Conjunctivae pink with no scleral jaundice. Nose: Normal mucosa and septum. NECK: Supple with no cervical or supraclavicular lymphadenopathy. Trachea is midline. Poor dental , broken teeth but clean oral cavity  CHEST: No tenderness or deformity, no crepitus  LUNG: Clear to auscultation with good chest expansion. There are no crackles or wheezes, normal AP diameter.  BACK: No kyphosis of the thoracic spine. Symmetric, no curvature, ROM normal, no CVA  tenderness, no spinal tenderness   CVS: There is good S1  S2, rhythm is regular.  ABDOMEN: Globular and soft, nontender to palpation, non distended, no masses, no organomegaly, good bowel sounds, no rebound or guarding, no peritoneal signs.   EXTREMITIES: Atraumatic. Full range of motion on both upper and lower extremities, dependant on staff for mobility and uses wc when OOB  SKIN: Warm and dry, no erythema noted, no rashes or lesions.  NEUROLOGICAL: The patient is oriented to person, ADVANCED DEMENTIA        LABS:    Lab Results   Component Value Date    WBC 7.3 02/06/2020    HGB 11.4 (L) 02/06/2020    HCT 35.3 02/06/2020    MCV 99 02/06/2020     02/06/2020       Results for orders placed or performed in visit on 02/06/20   Basic Metabolic Panel   Result Value Ref Range    Sodium 138 136 - 145 mmol/L    Potassium 3.9 3.5 - 5.0 mmol/L    Chloride 100 98 - 107 mmol/L    CO2 33 (H) 22 - 31 mmol/L    Anion Gap, Calculation 5 5 - 18 mmol/L    Glucose 82 70 - 125 mg/dL    Calcium 8.9 8.5 - 10.5 mg/dL    BUN 11 8 - 28 mg/dL    Creatinine 0.46 (L) 0.60 - 1.10 mg/dL    GFR MDRD Af Amer >60 >60 mL/min/1.73m2    GFR MDRD Non Af Amer >60 >60 mL/min/1.73m2           No results found for: HGBA1C  Vitamin D, Total (25-Hydroxy)   Date Value Ref Range Status   02/04/2020 43.2 30.0 - 80.0 ng/mL Final     No results found for: [id number]    ASSESSMENT/PLAN:  1. Metabolic encephalopathy    2. Alzheimer's dementia without behavioral disturbance, unspecified timing of dementia onset (H)    3. Severe protein-calorie malnutrition (H)      Dementia:  Palliative Care  Anxiety  Pain  Progression to end-stage dementia, Hospice through [facility]. Appears comfortable,  confirmed by family and nursing reports. Medications have been switched to comfort  focused and in liquid/dissolvable froms. Morphine and Ativan available PRN, continue  oxygen as needed for comfort only.  -Continue plan of care  -Hospice management of pain and symptoms per  their discretion      Electronically signed by:  Dalton Todd CNP  This progress note was completed using Dragon software and there may be grammatical errors.

## 2021-06-17 NOTE — TELEPHONE ENCOUNTER
Telephone Encounter by Ap Salcedo RN at 11/23/2020  7:10 PM     Author: Ap Salcedo RN Service: -- Author Type: Registered Nurse    Filed: 11/23/2020  7:14 PM Encounter Date: 11/23/2020 Status: Signed    : Ap Salcedo RN (Registered Nurse)       [facility] Nurse Triage Telephone Note      Provider: DEEPTHI Deleon  Facility: [facility]    Facility Type: [facility]    Caller: AM nurse  Call Back Number:  [phone]    Allergies: Cefdinir and Sulfa (sulfonamide antibiotics)    Reason for call: Nurse calling to report Heme 2, BMP, and TSH results.  See results below:               Verbal Order/Direction given by Provider: No new orders.      Provider giving order: DEEPTHI Deleon    Verbal order given to: PM nurse      Ap Salcedo RN

## 2021-06-20 NOTE — LETTER
Letter by Karyn Valencia CNP at      Author: Karyn Valencia CNP Service: -- Author Type: --    Filed:  Encounter Date: 3/23/2020 Status: (Other)         Patient: Ade Bob   MR Number: [id number]   YOB: 1927   Date of Visit: 3/23/2020     Code Status:  DNR  Visit Type: Follow Up (Pain, sleepiness, dementia)     Facility:  [facility] [[id number]]        Facility Type: SNF (Skilled Nursing Facility, TCU)    History of Present Illness: Ade Bob is a 89 y.o. female who has a past medical history for Alzheimer's dementia, osteoporosis with multiple spinal fractures, HLD, malnutrition, vitamin D deficiency.  Recently hospitalized 1/28 to 1/31 for acute on chronic back pain and found to have T6 compression fracture with 50% height loss, superior and inferior endplate fractures of the sixth thoracic vertebrae and T8 spinal fracture with 70% height loss which appears new from 3 years ago.  She had previous T10, T12, L3-L4 fractures.  Her vitamin D level was 22.4.  She was treated with pain medications and bowel prophylaxis.  She was also told to wear TLSO when up out of bed however this provided her extreme discomfort and so this was discontinued in the past couple weeks to add more comfort care measures.    Today, she is lying in bed and states that she is very sleepy.  Her oxycodone was recently increased to scheduled oxycodone 5 mg 3 times daily and 2.5 mg every 6 hours as needed.  She denies any pain today.  Her blood pressures and heart rate have been in good control.  Her dementia does require her significant supervision and she is in the memory locked unit.      Review of Systems   Review of systems is difficult due to patient's dementia.  Nursing denies any new issues including her bowels and bladder.    Physical Exam   Vital signs: /87, heart rate 103 with most 80s to 90s, 18 for respiratory, 97.5 temp.  GENERAL APPEARANCE: In, elderly woman in no  acute distress.  HEENT: normocephalic, atraumatic  sclerae anicteric, conjunctivae clear and moist, EOM intact  LUNGS: respiratory effort normal.  CARD: Perfusing peripherally upper and lower extremities  EXTREMITIES: No cyanosis, clubbing or edema.  NEURO: Alert with cognitive impairment. Face is symmetric.  SKIN: Inspection of the skin reveals no rashes, ulcerations or petechiae.  PSYCH: euthymic          Labs:  All labs reviewed in the nursing home record.    Assessment:  1. Compression fracture of body of thoracic vertebra (H)     2. Acute midline thoracic back pain     3. Alzheimer's dementia without behavioral disturbance, unspecified timing of dementia onset (H)     4. Pain management         Plan:   At this time she does spend a fair amount of her time in bed due to significant discomfort with the TLSO brace.  This is been put on hold we will continue with that.  Her pain is fairly stable with Tylenol, Lidoderm gel and scheduled oxycodone.  We will continue with the scheduled oxycodone 5 mg 3 times daily and discontinue the PRN oxycodone due to complaints of sleepiness.  Would like to consider weaning her off the oxycodone scheduled doses in the near future.    Continue on dementia locked unit.      All other medications will continue along with current treatments.  Chronic conditions are stable at this time.    Electronically signed by: Karyn Valencia CNP

## 2021-06-20 NOTE — LETTER
Letter by Ofelia Ballesteros CNP at      Author: Ofelia Ballesteros CNP Service: -- Author Type: --    Filed:  Encounter Date: 2/25/2020 Status: (Other)         Patient: Ade Bob   MR Number: [id number]   YOB: 1927   Date of Visit: 2/25/2020     [facility]    Facility:   [facility] [[id number]]   Code Status: DNR  PCP: Ap García MD   Phone: [phone]   Fax: [phone]      CHIEF COMPLAINT/REASON FOR VISIT:  Chief Complaint   Patient presents with   ? Discharge Summary       HISTORY COURSE:  Ade is a 89 y.o. female undergoing physical and occupational therapy at [facility]. She is  with PMH of HLD, dementia, back pain, alzheimer's disease, underweight, malnutrition, T10, T12, L3, [id number] fractures for which she was hospitalized 3 years ago, who presents to the ED via [facility] with family for evaluation of back pain.   She was diagnosed with acute superior and inferior endplate compression fractures of T6 with 50% vertebral body height loss, minimal retropulsion into spinal canal and old T8 compression fractures. She was seen by neurosurgery and a [employer] [place] [employer] TLSO brace was placed 1/29/2020.          Today she is seen for a  face to face for discharge. She will discharge to [facility] today 2/25/20 with current medications and treatments.   She does wear a  TLSO brace when out of bed.   It appears she will need it for 12 weeks. She is alert to name only.   She reported her pain is controlled.   She denied chest pain or shortness of breath.  She denied cough or congestion.  She reports she is moving her bowels.          Review of Systems  Constitutional: Positive for activity change. Negative for appetite change, fatigue and fever.   HENT: Negative.  Negative for congestion.    Eyes: Negative.    Respiratory: Negative.  Negative for cough, shortness of breath and wheezing.     Cardiovascular: Negative.  Negative for chest pain and leg swelling.   Gastrointestinal: Negative.  Negative for abdominal distention, abdominal pain, constipation, diarrhea and nausea.   Endocrine: Negative.    Genitourinary: Negative.  Negative for dysuria.   Musculoskeletal: Positive for back pain. Negative for arthralgias.   Skin: Negative.  Negative for color change and wound.   Neurological: Negative.  Negative for dizziness.   Hematological: Negative.    Psychiatric/Behavioral: Negative.  Negative for agitation, behavioral problems and confusion.      Vitals:    02/25/20 0907   BP: 121/67   Pulse: 88   Resp: 20   Temp: 98.3  F (36.8  C)   SpO2: 92%   Weight: (!) 85 lb 6.4 oz (38.7 kg)       Physical Exam  Constitutional:       Appearance: She is well-developed.   HENT:      Head: Normocephalic.   Eyes:      Conjunctiva/sclera: Conjunctivae normal.   Neck:      Musculoskeletal: Normal range of motion.   Cardiovascular:      Rate and Rhythm: Normal rate and regular rhythm.      Heart sounds: Normal heart sounds. No murmur.   Pulmonary:      Effort: No respiratory distress.      Breath sounds: Normal breath sounds. No wheezing or rales.   Abdominal:      General: Bowel sounds are normal. There is no distension.      Palpations: Abdomen is soft.      Tenderness: There is no abdominal tenderness.   Musculoskeletal: Normal range of motion.      Comments: Patient wearing a TLSO brace   Skin:     General: Skin is warm.   Neurological:      Mental Status: She is alert.      Comments: Alert and oriented to self   Psychiatric:         Behavior: Behavior normal.      MEDICATION LIST:  Current Outpatient Medications   Medication Sig   ? acetaminophen (TYLENOL) 325 MG tablet Take 2 tablets (650 mg total) by mouth 3 (three) times a day.   ? alendronate (FOSAMAX) 70 MG tablet Take 70 mg by mouth once a week. Saturdays   ? aluminum-magnesium hydroxide-simethicone (MAALOX ADVANCED) 200-200-20 mg/5 mL Susp Take 30 mL by mouth  every 4 (four) hours as needed.   ? cholecalciferol, vitamin D3, 1,000 unit (25 mcg) tablet Take 2,000 Units by mouth daily.   ? gabapentin (NEURONTIN) 100 MG capsule Take 100 mg by mouth 3 (three) times a day.   ? lidocaine (XYLOCAINE) 5 % ointment Apply to mid back tid   ? meclizine (ANTIVERT) 25 mg tablet Take 25 mg by mouth every 4 (four) hours as needed. Give 1 hour before travel   ? multivitamin therapeutic tablet Take 1 tablet by mouth daily.   ? oxyCODONE (ROXICODONE) 5 MG immediate release tablet Take 0.5-1 tablets (2.5-5 mg total) by mouth every 4 (four) hours as needed.   ? polyethylene glycol (MIRALAX) 17 gram packet Take 1 packet (17 g total) by mouth daily.   ? senna-docusate (PERICOLACE) 8.6-50 mg tablet Take 1 tablet by mouth 2 (two) times a day.   ? traZODone (DESYREL) 50 MG tablet Take 0.5 tablets (25 mg total) by mouth at bedtime.       DISCHARGE DIAGNOSIS:    ICD-10-CM    1. Acute midline thoracic back pain M54.6    2. Cachexia (H) R64    3. Closed stable burst fracture of eighth thoracic vertebra, sequela S22.061S    4. Closed stable burst fracture of sixth thoracic vertebra with routine healing, subsequent encounter S22.051D    5. Pain management R52        MEDICAL EQUIPMENT NEEDS:  None needed for discharge   DISCHARGE PLAN/FACE TO FACE:  I certify that services are/were furnished while this patient was under the care of a physician and that a physician or an allowed non-physician practitioner (NPP), had a face-to-face encounter that meets the physician face-to-face encounter requirements. The encounter was in whole, or in part, related to the primary reason for home health. The patient is confined to his/her home and needs intermittent skilled nursing, physical therapy, speech-language pathology, or the continued need for occupational therapy. A plan of care has been established by a physician and is periodically reviewed by a physician.  Date of Face-to-Face Encounter: 2/25/20    I certify  that, based on my findings, the following services are medically necessary home health services: N/A going to memory care     My clinical findings support the need for the above skilled services because: N/A   This patient is homebound because: N/A     The patient is, or has been, under my care and I have initiated the establishment of the plan of care. This patient will be followed by a physician who will periodically review the plan of care.    Schedule follow up visit with primary care provider within 7 days to reestablish care.    Electronically signed by: Ofelia Ballesteros CNP

## 2021-06-20 NOTE — LETTER
Letter by Kylie Gambino CNP at      Author: Kylie Gambino CNP Service: -- Author Type: --    Filed:  Encounter Date: 2020 Status: (Other)         Patient: Ade Bob   MR Number: [id number]   YOB: 1927   Date of Visit: 2020       [facility]      NAME:  Ade Bob             :  10/23/1927    MRN: [id number]    CODE STATUS:  DNR    FACILITY: [facility] [[id number]]         CHIEF COMPLAIN/REASON FOR VISIT:  Chief Complaint   Patient presents with   ? FVP Care Coordination - Home Visit-Initial Assessment     WT LOSS       HISTORY OF PRESENT ILLNESS: Ade Bob is a 89 y.o. female being seen today for an annual review for [place] members requirement. She lives in a memory care unit in a LTC facility. Very poor historian so info gathered from nursing staff, MR as well as personal assessment. She is a frail elderly female with dementia, Alzheimer's, malnutrition, current wt 78.8lb and HO osteoporosis with several spinal fractures.  Seen in room today, smiling and pleasant. Saff reports no issues with behaviors. Often up for meals but than wants back to bed, skin intact.Staff report she does not seem to have pain and does sleep well.  Allergies   Allergen Reactions   ? Cefdinir Nausea Only   ? Sulfa (Sulfonamide Antibiotics) Unknown and Hives   :     Current Outpatient Medications   Medication Sig   ? mirtazapine (REMERON) 7.5 MG tablet Take 7.5 mg by mouth at bedtime.   ? acetaminophen (TYLENOL) 325 MG tablet Take 650 mg by mouth see administration instructions. Take 650 three times a day and take 650 daily prn   ? acetaminophen (TYLENOL) 500 MG tablet Take 1,000 mg by mouth 4 (four) times a day.   ? alendronate (FOSAMAX) 70 MG tablet Take 70 mg by mouth once a week.    ? aluminum-magnesium hydroxide-simethicone (MAALOX ADVANCED) 200-200-20 mg/5 mL Susp Take 30 mL by mouth every 4 (four) hours as needed.   ?  cholecalciferol, vitamin D3, 1,000 unit (25 mcg) tablet Take 2,000 Units by mouth daily.   ? gabapentin (NEURONTIN) 100 MG capsule Take 100 mg by mouth 3 (three) times a day.   ? lidocaine (XYLOCAINE) 5 % ointment Apply to mid back tid   ? meclizine (ANTIVERT) 25 mg tablet Take 25 mg by mouth every 4 (four) hours as needed. Give 1 hour before travel   ? multivitamin therapeutic tablet Take 1 tablet by mouth daily.   ? oxyCODONE (ROXICODONE) 5 MG immediate release tablet Take 5 mg by mouth 3 (three) times a day as needed.    ? polyethylene glycol (MIRALAX) 17 gram packet Take 1 packet (17 g total) by mouth daily.   ? senna-docusate (PERICOLACE) 8.6-50 mg tablet Take 1 tablet by mouth 2 (two) times a day.   ? traZODone (DESYREL) 50 MG tablet Take 0.5 tablets (25 mg total) by mouth at bedtime.         REVIEW OF SYSTEMS:    Due to advanced Alzheimer's unable to participate in ROS.      PHYSICAL EXAMINATION:  Vitals:    06/17/20 0839   BP: 95/66   Pulse: 87   Temp: 97.8  F (36.6  C)   Weight: (!) 78 lb 12.8 oz (35.7 kg)         GENERAL: Awake, Alert, oriented TO SELF, not in any form of acute distress, unable to answers questions appropriately,commands, conversant  HEENT: Head is normocephalic with normal hair distribution. No evidence of trauma. Ears: No acute purulent discharge. Eyes: Conjunctivae pink with no scleral jaundice. Nose: Normal mucosa and septum. NECK: Supple with no cervical or supraclavicular lymphadenopathy. Trachea is midline. Poor dental , broken teeth but clean oral cavity  CHEST: No tenderness or deformity, no crepitus  LUNG: Clear to auscultation with good chest expansion. There are no crackles or wheezes, normal AP diameter.  BACK: No kyphosis of the thoracic spine. Symmetric, no curvature, ROM normal, no CVA tenderness, no spinal tenderness   CVS: There is good S1  S2, rhythm is regular.  ABDOMEN: Globular and soft, nontender to palpation, non distended, no masses, no organomegaly, good bowel  sounds, no rebound or guarding, no peritoneal signs.   EXTREMITIES: Atraumatic. Full range of motion on both upper and lower extremities, dependant on staff for mobility and uses wc when OOB  SKIN: Warm and dry, no erythema noted, no rashes or lesions.  NEUROLOGICAL: The patient is oriented to person, ADVANCED DEENTIA        LABS:    Lab Results   Component Value Date    WBC 7.3 02/06/2020    HGB 11.4 (L) 02/06/2020    HCT 35.3 02/06/2020    MCV 99 02/06/2020     02/06/2020       Results for orders placed or performed in visit on 02/06/20   Basic Metabolic Panel   Result Value Ref Range    Sodium 138 136 - 145 mmol/L    Potassium 3.9 3.5 - 5.0 mmol/L    Chloride 100 98 - 107 mmol/L    CO2 33 (H) 22 - 31 mmol/L    Anion Gap, Calculation 5 5 - 18 mmol/L    Glucose 82 70 - 125 mg/dL    Calcium 8.9 8.5 - 10.5 mg/dL    BUN 11 8 - 28 mg/dL    Creatinine 0.46 (L) 0.60 - 1.10 mg/dL    GFR MDRD Af Amer >60 >60 mL/min/1.73m2    GFR MDRD Non Af Amer >60 >60 mL/min/1.73m2           No results found for: HGBA1C  Vitamin D, Total (25-Hydroxy)   Date Value Ref Range Status   02/04/2020 43.2 30.0 - 80.0 ng/mL Final     No results found for: [id number]    ASSESSMENT/PLAN:  1. Alzheimer's dementia without behavioral disturbance, unspecified timing of dementia onset (H)    2. Severe protein-calorie malnutrition (H)      1. DEMENTIA: RESIDES IN MEMORY CARE UNIT WHERE NEEDS ARE MET AND ANTICIPATED PER STAFF ON UNIT.    2. Malnutrtion: wt at 78.8lbs, no skin impairments. Offer supplements. Try Remeron 7.5 mg Q HS if family in agreement to see if appetite improves. Staff reports miriam appetite.     Case Management:     I have reviewed the facility care plan/MDS which was done on , 5/28/20  including the falls risk, nutrition and pain screening. I also reviewed the current immunizations, and preventive care.. Future cancer screening is not clinically indicated secondary to age/goals of care.   Patient's desire to return to the  community is not assessible due to her desire to Alzheimer's advancement  Advance Directive : DNR, pr staff and medical records, pt cannot participate in discussion  I reviewed the current advanced directives as reflected at  the facility chart.   .     Team Discussion:  I communicated with the appropriate disciplines involved with the Plan of Care:   Nursing       Patient Goal:  Patient's goal is comfort focus and DNR     Information reviewed:  Medications, vital signs, orders, and nursing notes.         Electronically signed by:  Kylie Gambino CNP  This progress note was completed using Dragon software and there may be grammatical errors.

## 2021-06-20 NOTE — LETTER
Letter by Joshua Ott MD at      Author: Joshua Ott MD Service: -- Author Type: --    Filed:  Encounter Date: 2/28/2020 Status: (Other)         Patient: Ade Bob   MR Number: [id number]   YOB: 1927   Date of Visit: 2/28/2020     [facility]    Facility:   [facility] [[id number]]   Code Status: DNR      CHIEF COMPLAINT/REASON FOR VISIT:  Chief Complaint   Patient presents with   ? H & P       HISTORY:      HPI: Ms. Bob is 89-year-old female with a history of Alzheimer's dementia, osteoporosis with multiple spinal fractures, hyperlipidemia, malnutrition, vitamin D deficiency, seen for admission to the TCU setting following her recent hospital stay.    Hospital course patient was hospitalized between January 28 and January 31 due to acute on chronic back pain.  It is unknown if there is injury due to the fact that patient lives alone and has severe dementia.  Work-up revealed new T6 compression fraction with 50% height loss, superior and inferior endplate fractures of that sixth thoracic vertebra.  There was also an age-indeterminate T8 spinal fracture with 70% height loss which appeared new from 3 years ago.  3 years ago she had known T10 T12 L3-L4 fractures from a January 2017 admission.  Patient's vitamin D level in 2017 was 22.4.  Patient was treated with scheduled Tylenol, topical lidocaine, low-dose oxycodone 2.5 mg every 4 hours, MiraLAX and senna for bowel prophylaxis.  Patient was seen by PT OT with recommendation for TCU care.  Patient was fit with a TLSO brace.    Since arrival at the facility, pain has been difficult to manage.  She has been quite comfortable in bed but every time staff gets her up to put on the brace she will cry out in pain.  The brace itself seems to be as bad or worse than the getting up prior to as far as the staff can tell.  The fit is not snug and she struggles with it.  Staff is  "talked to the family about trying to go without the splint for comfort.    Subjective/review of systems  - Compromised by patient's dementia  - Augmented by discussion with nursing staff    \"I feel pretty good\".  Patient repeatedly denies pain but I do see her while she is lying in bed without her TLSO on.  She does not recall being in the hospital.  She does not recall having back pain at all.  No other meaningful information could be obtained from the review of systems directly from the patient as she denied everything.  Staff reports the pain noted above.  They report that when she is not up she will eat when she is up she seems to uncomfortable to eat.  She has not been constipated.  No new falls or injuries.  No fevers.  Remainder 13 system ROS negative or unobtainable    Social history, patient believes she has 6 or 7 children.  She apparently has been living independently but she cannot tell me how or where.  I did call her daughter Rae but did not get an answer today.    Family history patient is unable to provide and I do not see documentation of it in the chart.    Past Medical History:   Diagnosis Date   ? Alzheimer's dementia (H)     per family report   ? Back pain 2017   ? Dementia (H)    ? Hyperlipidemia              No family history on file.  Social History     Socioeconomic History   ? Marital status:      Spouse name: Not on file   ? Number of children: Not on file   ? Years of education: Not on file   ? Highest education level: Not on file   Occupational History   ? Not on file   Social Needs   ? Financial resource strain: Not on file   ? Food insecurity:     Worry: Not on file     Inability: Not on file   ? Transportation needs:     Medical: Not on file     Non-medical: Not on file   Tobacco Use   ? Smoking status: Former Smoker     Packs/day: 1.00     Last attempt to quit: 1977     Years since quittin.1   ? Smokeless tobacco: Never Used   ? Tobacco comment: Quit smoking " many years ago   Substance and Sexual Activity   ? Alcohol use: No   ? Drug use: No   ? Sexual activity: Not Currently   Lifestyle   ? Physical activity:     Days per week: Not on file     Minutes per session: Not on file   ? Stress: Not on file   Relationships   ? Social connections:     Talks on phone: Not on file     Gets together: Not on file     Attends Taoist service: Not on file     Active member of club or organization: Not on file     Attends meetings of clubs or organizations: Not on file     Relationship status: Not on file   ? Intimate partner violence:     Fear of current or ex partner: Not on file     Emotionally abused: Not on file     Physically abused: Not on file     Forced sexual activity: Not on file   Other Topics Concern   ? Not on file   Social History Narrative   ? Not on file         Review of Systems as above    Vitals: Blood pressure 133/81 respirations 18 temperature 97.3 pulse 83 O2 sats 93% on room air weight is 83 pounds  Physical Exam  Cachectic elderly female who is resting in bed who greets me and is socially appropriate but minimally conversant.  Normocephalic atraumatic no raccoon or peraza sign she lets me see the anterior oropharynx which is clear neck is without mass or adenopathy heart is regular S1-S2 with soft systolic murmur at the left lower sternal border lungs are clear with good air movement no wheezing or accessory muscle use abdomen is thin denies pain, there is no guarding organomegaly or mass she allows me to palpate throughout the thoracic and lumbar spine and denies pain.  There is no bruising.  Paraspinous musculature is also nontender.  She reports sensation to light touch in both feet and legs.  She moves legs equally.  I do not get out of bed for gait checking.  She has no edema.  She has good capillary refill in hands and feet.  Skin is intact thin and dry in visualized areas  LABS:   Results for DEEPIKA BHATTI (MRN [id number]) as of 2/29/2020  17:44   Ref. Range 2/6/2020 05:50 2/10/2020 10:45   Sodium Latest Ref Range: 136 - 145 mmol/L 138    Potassium Latest Ref Range: 3.5 - 5.0 mmol/L 3.9    Chloride Latest Ref Range: 98 - 107 mmol/L 100    CO2 Latest Ref Range: 22 - 31 mmol/L 33 (H)    Anion Gap, Calculation Latest Ref Range: 5 - 18 mmol/L 5    BUN Latest Ref Range: 8 - 28 mg/dL 11    Creatinine Latest Ref Range: 0.60 - 1.10 mg/dL 0.46 (L)    GFR MDRD Af Amer Latest Ref Range: >60 mL/min/1.73m2 >60    GFR MDRD Non Af Amer Latest Ref Range: >60 mL/min/1.73m2 >60    Calcium Latest Ref Range: 8.5 - 10.5 mg/dL 8.9    Glucose Latest Ref Range: 70 - 125 mg/dL 82    WBC Latest Ref Range: 4.0 - 11.0 thou/uL 7.3    RBC Latest Ref Range: 3.80 - 5.40 mill/uL 3.56 (L)    Hemoglobin Latest Ref Range: 12.0 - 16.0 g/dL 11.4 (L)    Hematocrit Latest Ref Range: 35.0 - 47.0 % 35.3    MCV Latest Ref Range: 80 - 100 fL 99    MCH Latest Ref Range: 27.0 - 34.0 pg 32.0    MCHC Latest Ref Range: 32.0 - 36.0 g/dL 32.3    RDW Latest Ref Range: 11.0 - 14.5 % 14.4    Platelets Latest Ref Range: 140 - 440 thou/uL 338    MPV Latest Ref Range: 8.5 - 12.5 fL 8.8    CULTURE, URINE Unknown  Rpt   Color, UA Latest Ref Range: Colorless, Yellow, Straw, Light Yellow   Yellow   Clarity, UA Latest Ref Range: Clear   Cloudy (!)   Blood, UA Latest Ref Range: Negative   Negative   Bilirubin, UA Latest Ref Range: Negative   Negative   Urobilinogen, UA Latest Ref Range: <2.0 E.U./dL, 2.0 E.U./dL   <2.0 E.U./dL   Ketones, UA Latest Ref Range: Negative   Negative   Glucose, UA Latest Ref Range: Negative   Negative   Protein, UA Latest Ref Range: Negative mg/dL  Trace (!)   Nitrite, UA Latest Ref Range: Negative   Negative   Leukocytes, UA Latest Ref Range: Negative   Negative   pH, UA Latest Ref Range: 4.5 - 8.0   7.5   Specific Gravity, UA Latest Ref Range: 1.001 - 1.030   1.017   RBC, UA Latest Ref Range: None Seen, 0-2 hpf  0-2   WBC, UA Latest Ref Range: None Seen, 0-5 hpf  0-5   Bacteria, UA  Latest Ref Range: None Seen hpf  None Seen   Mucus, UA Latest Ref Range: None Seen lpf  Few (!)   Squam Epithel, UA Latest Ref Range: None Seen, 0-5 lpf  0-5   Amorphous, UA Latest Ref Range: None Seen   Moderate (!)       ASSESSMENT:      ICD-10-CM    1. Alzheimer's dementia without behavioral disturbance, unspecified timing of dementia onset (H) G30.9     F02.80    2. Acute midline thoracic back pain M54.6    3. Compression fracture of body of thoracic vertebra (H) S22.000A      Assessment/plan    New T6 superior/inferior endplate compression fracture with 50% height loss  Age-indeterminate T8 compression fracture with 70% height loss  Old T10 T12 L3-L4 compression fractures  Vitamin D deficiency  Osteoporosis      Pain control has not been good here.  She has required more opiates than she was getting in the hospital when pain was reportedly controlled.  However there is no history of new injuries or falls.  I suspect that this is ongoing pain rather than new/worsening pain however further imaging may be warranted if it does not settle down.  Highly likely to be due to the compression fractures due to the primary aggravating factor.  Nursing reports concern with a higher dose of oxycodone total.  They report that the 5 mg 3 times daily scheduled has made nursing cares go better.  We elected to continue the 5 mg 3 times daily scheduled and not the PRN down to 2.5 every 4 hours as needed.  Nursing staff/therapy staff is of the opinion that the brace is making things worse rather than better.  For quality of life they have talked to the family about going without the brace.  I am supportive of that as it appears patient is in a station where comfort needs to be primary focus.  I put a call into the daughter Rae but was unable to speak to her today.  - Opiate change as above  - Discontinue TLSO to see how it goes.  We could always come back to it if need be  -Consider additional imaging if this is truly worsening  pain rather than unresolved pain  - Continue scheduled acetaminophen lidocaine patch gabapentin and bowel prophylaxis as current with senna/docusate twice daily and PEG daily    Osteoporosis  Vitamin D deficiency    She is getting 50,000 units weekly which is likely appropriate though we did not check a vitamin D level yet.  May not be necessary to check levels before going with a more comfort care approach.  It is not clear to me that she is rehabbable-time will tell.  -Consider palliative care/hospice depending on patient's ability to rehab    Cachexia    BMI 14.66.  Dietary consult, high-protein supplements          MD gabriela Artis MD

## 2021-06-20 NOTE — LETTER
"Letter by Joshua Ott MD at      Author: Joshua Ott MD Service: -- Author Type: --    Filed:  Encounter Date: 5/15/2020 Status: (Other)         Patient: Ade Bob   MR Number: [id number]   YOB: 1927   Date of Visit: 5/15/2020         [facility] telehealth visit        Video Visit        Ade Bob is a 89 y.o. female who is being evaluated via a billable video visit.      The patient has been notified of following:     \"This video visit will be conducted via a call between you and your physician/provider. We have found that certain health care needs can be provided without the need for an in-person physical exam.  This service lets us provide the care you need with a video conversation.  If a prescription is necessary we can send it to the facility team.  If lab work is needed we can place an order through the facility team to have that test done at a later time.    If during the course of the call the physician/provider feels a video visit is not appropriate, you will not be charged for this service.\"     Physician/Provider has received verbal consent for a Video Visit from the patient/family/facility staff on 5/15/2020    HPI statement: Ade Bob is a 89 y.o. female who is being evaluated via a billable video visit by Joshua Ott MD using the face time platform from home office.  There was seen at the residence with facility staff.          Video Start Time: 11:33 AM           [facility]    Facility:  [facility] [[id number]]    Code Status:  DNR    Chief Complaint   Patient presents with   ? Review Of Multiple Medical Conditions   :                    Patient is seen today for a federally mandated regulatory visit                 Patient Active Problem List   Diagnosis   ? Back pain   ? Compression fracture of body of thoracic vertebra (H)   ? Cachexia " (H)   ? Malnutrition (H)   ? Severe protein-calorie malnutrition (H)   ? Hyponatremia   ? Thoracic compression fracture, closed, initial encounter (H)   ? Alzheimer's dementia (H)   ? Advanced directives, counseling/discussion   ? Closed stable burst fracture of T6 vertebra (H)   ? Closed stable burst fracture of T8 vertebra (H)   ? Metabolic encephalopathy       HPI statement: Ade Bob is a 89 y.o. female who is being evaluated via a billable video visit by Joshua Ott MD using the face time platform from home office.  There was seen at the residence with facility staff.      Time of visit:    Patient/POA/facility staff gave verbal consent to receive medical care via telemedicine on 5/15/2020.         Subjective/review of systems/history:  Ade Bob  is a 89 year old female with history of Alzheimer's dementia, osteoporosis with multiple spinal fractures, hyperlipidemia, malnutrition, vitamin D deficiency seen on 5/15/2020 for a federally mandated regulatory visit and to address concerns noted below in the assessment/plan section of this note.    Recall that she was recently admitted January 28 through January 31 for acute on chronic back pain.  Work-up revealed new T6 compression fracture age-indeterminate T8 fracture and old T10 T12 L3-L4 fractures.  Patient was unable to tolerate TLSO which was taken off and she has done better since that time.    Patient is unable to contribute to history today.  Staff reports that she is been much more comfortable since going off the brace.  She likes to stay in bed and spends most of her day there.  They try to get her up at least once a day and swelling much better than it did earlier on.    Patient's weight has been stable, she is eating.  She seems content to the staff.  She does not cry out in pain.  She has no behavioral issues of concern.  She responds to questions but is hard of hearing.  She initiates little conversation.  She remains  on oxycodone 5 mg 3 times daily but it is not clear that she still needs it.  She is also on Tylenol 4 g daily.    There was some consideration of hospice earlier but since she has been clinically stable hospice has not been consulted.    Remainder 13 system ROS negative or unobtainable including no constipation fever sweats chills cough hypoxia falls injuries    Past Medical History:   Diagnosis Date   ? Alzheimer's dementia (H)     per family report   ? Back pain 2017   ? Dementia (H)    ? Hyperlipidemia      Past Surgical History:   Procedure Laterality Date   ? NO PAST SURGERIES            No family history on file.:   Unobtainable, patient unaware    Social History     Socioeconomic History   ? Marital status:      Spouse name: Not on file   ? Number of children: Not on file   ? Years of education: Not on file   ? Highest education level: Not on file   Occupational History   ? Not on file   Social Needs   ? Financial resource strain: Not on file   ? Food insecurity     Worry: Not on file     Inability: Not on file   ? Transportation needs     Medical: Not on file     Non-medical: Not on file   Tobacco Use   ? Smoking status: Former Smoker     Packs/day: 1.00     Last attempt to quit: 1977     Years since quittin.3   ? Smokeless tobacco: Never Used   ? Tobacco comment: Quit smoking many years ago   Substance and Sexual Activity   ? Alcohol use: No   ? Drug use: No   ? Sexual activity: Not Currently   Lifestyle   ? Physical activity     Days per week: Not on file     Minutes per session: Not on file   ? Stress: Not on file   Relationships   ? Social connections     Talks on phone: Not on file     Gets together: Not on file     Attends [employer] service: Not on file     Active member of club or organization: Not on file     Attends meetings of clubs or organizations: Not on file     Relationship status: Not on file   ? Intimate partner violence     Fear of current or ex partner: Not on file      Emotionally abused: Not on file     Physically abused: Not on file     Forced sexual activity: Not on file   Other Topics Concern   ? Not on file   Social History Narrative   ? Not on file   :        Current Outpatient Medications   Medication Sig   ? acetaminophen (TYLENOL) 325 MG tablet Take 650 mg by mouth see administration instructions. Take 650 three times a day and take 650 daily prn   ? alendronate (FOSAMAX) 70 MG tablet Take 70 mg by mouth once a week. Saturdays   ? aluminum-magnesium hydroxide-simethicone (MAALOX ADVANCED) 200-200-20 mg/5 mL Susp Take 30 mL by mouth every 4 (four) hours as needed.   ? cholecalciferol, vitamin D3, 1,000 unit (25 mcg) tablet Take 2,000 Units by mouth daily.   ? gabapentin (NEURONTIN) 100 MG capsule Take 100 mg by mouth 3 (three) times a day.   ? lidocaine (XYLOCAINE) 5 % ointment Apply to mid back tid   ? meclizine (ANTIVERT) 25 mg tablet Take 25 mg by mouth every 4 (four) hours as needed. Give 1 hour before travel   ? multivitamin therapeutic tablet Take 1 tablet by mouth daily.   ? oxyCODONE (ROXICODONE) 5 MG immediate release tablet Take 5 mg by mouth 3 (three) times a day as needed.    ? polyethylene glycol (MIRALAX) 17 gram packet Take 1 packet (17 g total) by mouth daily.   ? senna-docusate (PERICOLACE) 8.6-50 mg tablet Take 1 tablet by mouth 2 (two) times a day.   ? traZODone (DESYREL) 50 MG tablet Take 0.5 tablets (25 mg total) by mouth at bedtime.   ? acetaminophen (TYLENOL) 500 MG tablet Take 1,000 mg by mouth 4 (four) times a day.   :        Cefdinir and Sulfa (sulfonamide antibiotics)    Vitals:  There were no vitals taken for this visit.                Current Vitals   BP: 91/56 mmHg  5/15/2020 21:39  Temp:97.8  F  5/15/2020 21:39  Pulse: 90 bpm  5/15/2020 21:39  Weight: 79.6 Lbs  5/4/2020 08:24  Resp: 16 Breaths/min  5/15/2020 21:39  BS: 18 mg/dL  4/4/2020 00:43  O2: 91 %  5/15/2020 21:39  Pain: 0  5/15/2020 17:47  There is no height or weight on file to  calculate BMI.    Physical exam: If performed was purely observational/via visual confirmation    Observations gathered via telehealth on 5/15/2020    General:  Alert pleasantly confused.  Has difficulty figuring out the video format that were working with today.  Appears comfortable.  No tachypnea or accessory muscle use.  Head appears normocephalic atraumatic gaze is conjugate sclera clear  Speech clear, she will he says only yes or no during the time and with her  Extemities moving all 4 extremities, no obvious edema  Skin clear in visualized areas      Labs:  Lab Results   Component Value Date    WBC 7.3 02/06/2020    HGB 11.4 (L) 02/06/2020    HCT 35.3 02/06/2020    MCV 99 02/06/2020     02/06/2020     Results for orders placed or performed in visit on 02/06/20   Basic Metabolic Panel   Result Value Ref Range    Sodium 138 136 - 145 mmol/L    Potassium 3.9 3.5 - 5.0 mmol/L    Chloride 100 98 - 107 mmol/L    CO2 33 (H) 22 - 31 mmol/L    Anion Gap, Calculation 5 5 - 18 mmol/L    Glucose 82 70 - 125 mg/dL    Calcium 8.9 8.5 - 10.5 mg/dL    BUN 11 8 - 28 mg/dL    Creatinine 0.46 (L) 0.60 - 1.10 mg/dL    GFR MDRD Af Amer >60 >60 mL/min/1.73m2    GFR MDRD Non Af Amer >60 >60 mL/min/1.73m2         Lab Results   Component Value Date    TSH 1.53 12/05/2016     @LASTA!C@  [unfilled]  No results found for: CXNSXCHG96  No results found for: BNP  @LASTiZettleTX@        Invalid input(s): PRINTERVAL      Assessment/Plan:      ICD-10-CM    1. Severe protein-calorie malnutrition (H)  E43    2. Thoracic compression fracture, closed, initial encounter (H)  S22.000A    3. Alzheimer's dementia without behavioral disturbance, unspecified timing of dementia onset (H)  G30.9     F02.80    4. Closed stable burst fracture of sixth thoracic vertebra with routine healing, subsequent encounter  S22.051D    5. Metabolic encephalopathy  G93.41        Osteoporosis  New T6 compression fracture  Multiple old compression fractures as noted  above  Vitamin D deficiency   Patient continues on multivitamin, vitamin D replacement.  The acute pain seems to have improved greatly.  Discussed options with nursing staff of gradual dosage reduction versus changing it to as needed.  We agreed to change oxycodone to 5 mg every 8 as needed.  We will see how often she needs it and consider decreasing it to 2.5 as needed and then perhaps off.  Meanwhile she is been taking maximum dose Tylenol for many weeks.  She is rather slightly in size.  I would recommend and ordered it decreased to 650 3 times daily plus an additional 650 mg once daily on a as needed basis.  TLSO has been discontinued.  Staff reports that she ambulates to the bathroom without apparent pain.    Cachexia   Weight has been stable.  Staff reports that she eats pretty well actually.  No more lab work is anticipated.  Comfort care focus remains primary care      Case discussed with:    Facility staff       Video-Visit Details    Type of service:  Video Visit    Video End Time (time video stopped): 11:38 AM    Originating Location (pt. Location):[facility] [[id number]]    Distant Location (provider location):  [facility]     Mode of Communication:  FaceTime video conference        Joshua Ott MD  Phone/video time spent with patient discussing health concerns, > 50% on counseling and coordination of care.      Joshua Ott MD

## 2021-06-20 NOTE — LETTER
Letter by Ofelia Ballesteros CNP at      Author: Ofelia Ballesteros CNP Service: -- Author Type: --    Filed:  Encounter Date: 2/3/2020 Status: (Other)         Patient: Ade Bob   MR Number: [id number]   YOB: 1927   Date of Visit: 2/3/2020     [facility]    Facility:   [facility] [[id number]]   Code Status: DNR      CHIEF COMPLAINT/REASON FOR VISIT:  Chief Complaint   Patient presents with   ? Review Of Multiple Medical Conditions       HISTORY:      HPI: Ade is a 89 y.o. female undergoing physical and occupational therapy at [facility]. She is  with PMH of HLD, dementia, back pain, alzheimer's disease, underweight, malnutrition, T10, T12, L3, [id number] fractures for which she was hospitalized 3 years ago, who presents to the ED via [facility] with family for evaluation of back pain.   She was diagnosed with acute superior and inferior endplate compression fractures of T6 with 50% vertebral body height loss, minimal retropulsion into spinal canal and old T8 compression fractures. She was seen by neurosurgery and a [employer] TLSO brace was placed 1/29/2020.        Today she is seen for a first routine visit to review multiple medical issues.  Her son and daughter were at the bedside.  She denied chest pain or shortness of breath.  She denied cough or congestion.  She reports she is moving her bowels.  She does refuse to eat and per the family they go to her house 3 times a day to monitor her eating.  She did however eat 75% of her breakfast with cues from her daughter.  Her dietary consult was placed.  It appears she will need long-term care placement when she discharges.  She currently is on 50,000 units of vitamin D however last level found in her chart was from 2017 a vitamin D level will be checked in the a.m. and her medication adjusted as appropriate.  She is wearing a TLSO brace.  She was oriented to self.   She denied pain however her daughter reports that she will scream in pain when she  needs to move.    Past Medical History:   Diagnosis Date   ? Alzheimer's dementia     per family report   ? Back pain 2017   ? Dementia    ? Hyperlipidemia              No family history on file.  Social History     Socioeconomic History   ? Marital status: Single     Spouse name: Not on file   ? Number of children: Not on file   ? Years of education: Not on file   ? Highest education level: Not on file   Occupational History   ? Not on file   Social Needs   ? Financial resource strain: Not on file   ? Food insecurity:     Worry: Not on file     Inability: Not on file   ? Transportation needs:     Medical: Not on file     Non-medical: Not on file   Tobacco Use   ? Smoking status: Former Smoker     Packs/day: 1.00     Last attempt to quit: 1977     Years since quittin.1   ? Smokeless tobacco: Never Used   ? Tobacco comment: Quit smoking many years ago   Substance and Sexual Activity   ? Alcohol use: No   ? Drug use: No   ? Sexual activity: Not Currently   Lifestyle   ? Physical activity:     Days per week: Not on file     Minutes per session: Not on file   ? Stress: Not on file   Relationships   ? Social connections:     Talks on phone: Not on file     Gets together: Not on file     Attends [employer] service: Not on file     Active member of club or organization: Not on file     Attends meetings of clubs or organizations: Not on file     Relationship status: Not on file   ? Intimate partner violence:     Fear of current or ex partner: Not on file     Emotionally abused: Not on file     Physically abused: Not on file     Forced sexual activity: Not on file   Other Topics Concern   ? Not on file   Social History Narrative   ? Not on file         Review of Systems   Constitutional: Positive for activity change. Negative for appetite change, fatigue and fever.   HENT: Negative.  Negative for congestion.    Eyes: Negative.     Respiratory: Negative.  Negative for cough, shortness of breath and wheezing.    Cardiovascular: Negative.  Negative for chest pain and leg swelling.   Gastrointestinal: Negative.  Negative for abdominal distention, abdominal pain, constipation, diarrhea and nausea.   Endocrine: Negative.    Genitourinary: Negative.  Negative for dysuria.   Musculoskeletal: Positive for back pain. Negative for arthralgias.   Skin: Negative.  Negative for color change and wound.   Neurological: Negative.  Negative for dizziness.   Hematological: Negative.    Psychiatric/Behavioral: Negative.  Negative for agitation, behavioral problems and confusion.       Vitals:    02/03/20 0823   BP: 120/62   Pulse: 93   Resp: 22   Temp: 98.2  F (36.8  C)   SpO2: 91%       Physical Exam  Constitutional:       Appearance: She is well-developed.   HENT:      Head: Normocephalic.   Eyes:      Conjunctiva/sclera: Conjunctivae normal.   Neck:      Musculoskeletal: Normal range of motion.   Cardiovascular:      Rate and Rhythm: Normal rate and regular rhythm.      Heart sounds: Normal heart sounds. No murmur.   Pulmonary:      Effort: No respiratory distress.      Breath sounds: Normal breath sounds. No wheezing or rales.   Abdominal:      General: Bowel sounds are normal. There is no distension.      Palpations: Abdomen is soft.      Tenderness: There is no abdominal tenderness.   Musculoskeletal: Normal range of motion.      Comments: Patient wearing a TLSO brace   Skin:     General: Skin is warm.   Neurological:      Mental Status: She is alert.      Comments: Alert and oriented to self   Psychiatric:         Behavior: Behavior normal.           LABS:   Recent Results (from the past 240 hour(s))   Basic Metabolic Panel   Result Value Ref Range    Sodium 141 136 - 145 mmol/L    Potassium 4.2 3.5 - 5.0 mmol/L    Chloride 103 98 - 107 mmol/L    CO2 22 22 - 31 mmol/L    Anion Gap, Calculation 16 5 - 18 mmol/L    Glucose 102 70 - 125 mg/dL    Calcium  10.0 8.5 - 10.5 mg/dL    BUN 19 8 - 28 mg/dL    Creatinine 0.64 0.60 - 1.10 mg/dL    GFR MDRD Af Amer >60 >60 mL/min/1.73m2    GFR MDRD Non Af Amer >60 >60 mL/min/1.73m2   HM2 (CBC W/O DIFF)   Result Value Ref Range    WBC 8.1 4.0 - 11.0 thou/uL    RBC 4.40 3.80 - 5.40 mill/uL    Hemoglobin 14.0 12.0 - 16.0 g/dL    Hematocrit 43.7 35.0 - 47.0 %    MCV 99 80 - 100 fL    MCH 31.8 27.0 - 34.0 pg    MCHC 32.0 32.0 - 36.0 g/dL    RDW 13.6 11.0 - 14.5 %    Platelets 371 140 - 440 thou/uL    MPV 8.7 8.5 - 12.5 fL   C-Reactive Protein   Result Value Ref Range    CRP 8.3 (H) 0.0 - 0.8 mg/dL       ASSESSMENT:      ICD-10-CM    1. Closed stable burst fracture of sixth thoracic vertebra with routine healing, subsequent encounter S22.051D    2. Closed stable burst fracture of eighth thoracic vertebra, sequela S22.061S    3. Severe protein-calorie malnutrition (H) E43        PLAN:    T6, T8 burst fracture PT/OT, on Alendronate weekly    Malnutrition dietary consult , daily weights     Vit d deficiency-50,000 weekly, Last Vit D     Insomnia On Trazadone    Pain management Tylenol 650 MG TID    Electronically signed by: Ofelia Ballesteros, CNP

## 2021-06-20 NOTE — LETTER
Letter by Dalton Todd CNP at      Author: Dalton Todd CNP Service: -- Author Type: --    Filed:  Encounter Date: 2020 Status: (Other)         Patient: Ade Bob   MR Number: [id number]   YOB: 1927   Date of Visit: 2020       [facility]      NAME:  Ade Bob             :  10/23/1927    MRN: [id number]    CODE STATUS:  DNR    FACILITY: [facility] [[id number]]         CHIEF COMPLAIN/REASON FOR VISIT:  Chief Complaint   Patient presents with   ? FVP Care Coordination - Regulatory       HISTORY OF PRESENT ILLNESS: Ade Bob is a 89 y.o. female being seen today for Regulatory visit for [facility] requirement. She lives in a memory care unit in a LTC facility.     Frail elderly with dementia, malnutrition with current weight at 81 pounds.  She has had several spine fractures and upper back pain which she uses a lidocaine patch for.  Today she is lying comfortably does not appear to be in any distress or pain.  She is smiling and pleasant but does not respond to ROS questions.  Staff report no behavior issues.  She is often up to eat however she quickly wants to go back to bed.  She has no open areas or skin breakdown that are we are aware of.      Allergies   Allergen Reactions   ? Cefdinir Nausea Only   ? Sulfa (Sulfonamide Antibiotics) Unknown and Hives   :     Current Outpatient Medications   Medication Sig   ? acetaminophen (TYLENOL) 325 MG tablet Take 650 mg by mouth see administration instructions. Take 650 three times a day and take 650 daily prn   ? acetaminophen (TYLENOL) 500 MG tablet Take 1,000 mg by mouth 4 (four) times a day.   ? alendronate (FOSAMAX) 70 MG tablet Take 70 mg by mouth once a week.    ? aluminum-magnesium hydroxide-simethicone (MAALOX ADVANCED) 200-200-20 mg/5 mL Susp Take 30 mL by mouth every 4 (four) hours as needed.   ? cholecalciferol, vitamin D3, 1,000  unit (25 mcg) tablet Take 2,000 Units by mouth daily.   ? gabapentin (NEURONTIN) 100 MG capsule Take 100 mg by mouth 3 (three) times a day.   ? lidocaine (XYLOCAINE) 5 % ointment Apply to mid back tid   ? meclizine (ANTIVERT) 25 mg tablet Take 25 mg by mouth every 4 (four) hours as needed. Give 1 hour before travel   ? mirtazapine (REMERON) 7.5 MG tablet Take 7.5 mg by mouth at bedtime.   ? multivitamin therapeutic tablet Take 1 tablet by mouth daily.   ? oxyCODONE (ROXICODONE) 5 MG immediate release tablet Take 5 mg by mouth 3 (three) times a day as needed.    ? polyethylene glycol (MIRALAX) 17 gram packet Take 1 packet (17 g total) by mouth daily.   ? senna-docusate (PERICOLACE) 8.6-50 mg tablet Take 1 tablet by mouth 2 (two) times a day.   ? traZODone (DESYREL) 50 MG tablet Take 0.5 tablets (25 mg total) by mouth at bedtime.         REVIEW OF SYSTEMS:    Due to advanced Alzheimer's unable to participate in ROS.      PHYSICAL EXAMINATION:  Vitals:    09/01/20 0925   BP: 105/57   Pulse: 88   Temp: 98  F (36.7  C)   Weight: (!) 88 lb (39.9 kg)         GENERAL: Awake, Alert, oriented TO SELF, not in any form of acute distress, unable to answers questions appropriately,commands, conversant  HEENT: Head is normocephalic with normal hair distribution. No evidence of trauma. Ears: No acute purulent discharge. Eyes: Conjunctivae pink with no scleral jaundice. Nose: Normal mucosa and septum. NECK: Supple with no cervical or supraclavicular lymphadenopathy. Trachea is midline. Poor dental , broken teeth but clean oral cavity  CHEST: No tenderness or deformity, no crepitus  LUNG: Clear to auscultation with good chest expansion. There are no crackles or wheezes, normal AP diameter.  BACK: No kyphosis of the thoracic spine. Symmetric, no curvature, ROM normal, no CVA tenderness, no spinal tenderness   CVS: There is good S1  S2, rhythm is regular.  ABDOMEN: Globular and soft, nontender to palpation, non distended, no masses, no  organomegaly, good bowel sounds, no rebound or guarding, no peritoneal signs.   EXTREMITIES: Atraumatic. Full range of motion on both upper and lower extremities, dependant on staff for mobility and uses wc when OOB  SKIN: Warm and dry, no erythema noted, no rashes or lesions.  NEUROLOGICAL: The patient is oriented to person, [facility]        LABS:    Lab Results   Component Value Date    WBC 7.3 02/06/2020    HGB 11.4 (L) 02/06/2020    HCT 35.3 02/06/2020    MCV 99 02/06/2020     02/06/2020       Results for orders placed or performed in visit on 02/06/20   Basic Metabolic Panel   Result Value Ref Range    Sodium 138 136 - 145 mmol/L    Potassium 3.9 3.5 - 5.0 mmol/L    Chloride 100 98 - 107 mmol/L    CO2 33 (H) 22 - 31 mmol/L    Anion Gap, Calculation 5 5 - 18 mmol/L    Glucose 82 70 - 125 mg/dL    Calcium 8.9 8.5 - 10.5 mg/dL    BUN 11 8 - 28 mg/dL    Creatinine 0.46 (L) 0.60 - 1.10 mg/dL    GFR MDRD Af Amer >60 >60 mL/min/1.73m2    GFR MDRD Non Af Amer >60 >60 mL/min/1.73m2           No results found for: HGBA1C  Vitamin D, Total (25-Hydroxy)   Date Value Ref Range Status   02/04/2020 43.2 30.0 - 80.0 ng/mL Final     No results found for: [id number]    ASSESSMENT/PLAN:  1. Alzheimer's dementia without behavioral disturbance, unspecified timing of dementia onset (H)    2. Severe protein-calorie malnutrition (H)    3. Acute midline thoracic back pain      Dementia: does not participate in discussion although is smiling at my presence. She appears comfortable. Is on locked care unit. Needs met through total assist of care.     Severe protein calorie malnutrition: Weight remains at 81lbs. Continues on remeron.     Back pain: continues lidocaine patch and tylenol.     Case Management:     I have reviewed the facility care plan/MDS which was done quarterly.  including the falls risk, nutrition and pain screening. I also reviewed the current immunizations, and preventive care.. Future cancer screening is  not clinically indicated secondary to age/goals of care.   Patient's desire to return to the community is not assessible due to her desire to Alzheimer's advancement  Advance Directive : DNR, pr staff and medical records, pt cannot participate in discussion  I reviewed the current advanced directives as reflected at  the facility chart.   .  Team Discussion:  I communicated with the appropriate disciplines involved with the Plan of Care:   Nursing       Patient Goal:  Patient's goal is comfort focus and DNR     Information reviewed:  Medications, vital signs, orders, and nursing notes.         Electronically signed by:  Dalton Todd CNP  This progress note was completed using Dragon software and there may be grammatical errors.

## 2021-06-20 NOTE — LETTER
Letter by Joshua Ott MD at      Author: Joshua Ott MD Service: -- Author Type: --    Filed:  Encounter Date: 3/6/2020 Status: (Other)         Patient: Ade Bob   MR Number: [id number]   YOB: 1927   Date of Visit: 3/6/2020     [facility]    Facility:   [facility] [[id number]]   Code Status: DNR      CHIEF COMPLAINT/REASON FOR VISIT:  Chief Complaint   Patient presents with   ? Review Of Multiple Medical Conditions       HISTORY:      HPI:  Ms. Bob is 89-year-old female with a history of Alzheimer's dementia, osteoporosis with multiple spinal fractures, hyperlipidemia, malnutrition, vitamin D deficiency, seen for admission to the TCU setting following her recent hospital stay.     Hospital course patient was hospitalized between January 28 and January 31 due to acute on chronic back pain.  It is unknown if there is injury due to the fact that patient lives alone and has severe dementia.  Work-up revealed new T6 compression fraction with 50% height loss, superior and inferior endplate fractures of that sixth thoracic vertebra.  There was also an age-indeterminate T8 spinal fracture with 70% height loss which appeared new from 3 years ago.  3 years ago she had known T10 T12 L3-L4 fractures from a January 2017 admission.  Patient's vitamin D level in 2017 was 22.4.  Patient was treated with scheduled Tylenol, topical lidocaine, low-dose oxycodone 2.5 mg every 4 hours, MiraLAX and senna for bowel prophylaxis.  Patient was seen by PT OT with recommendation for TCU care.  Patient was fit with a TLSO brace.     Since arrival at the facility, pain has been difficult to manage.  She has been quite comfortable in bed but every time staff gets her up to put on the brace she will cry out in pain.  The brace itself seems to be as bad or worse than the getting up prior to as far as the staff can tell.  The fit is not snug and  "she struggles with it.  Staff is talked to the family about trying to go without the splint for comfort.     Subjective/review of systems  Follow-up with patient to  review her back pain.  I met her last week and based on her difficulty being up and dealing with the back brace we discontinued mandatory use of the back brace when up.  Is able to discuss the situation with the physical therapist who knows her well.  They say that she is doing better and that she can actually walk the entire length of the henley with her walker without apparent discomfort.  Staff reports that it is not is difficult to get her up out of bed although that she would still prefer to be in bed above all other things.  Therapist says that they are most concerned about rotational bending but due to the patient's stability as well as her consistent ability to \"square wrap\" with transfers she is not at high risk for rotational injury.    Patient is dependent on staff for all cares.  She particularly does not seem to want to eat and the staff is working with her and basically feeding her in which case she does eat.    There is been no trouble with constipation no new falls or injuries no fever sweats or chills.  Patient is unable to contribute to history and review of systems due to her severe dementia.  She does tell me that she has no pain    Past Medical History:   Diagnosis Date   ? Alzheimer's dementia (H)     per family report   ? Back pain 01/14/2017   ? Dementia (H)    ? Hyperlipidemia              No family history on file.  Social History     Socioeconomic History   ? Marital status:      Spouse name: Not on file   ? Number of children: Not on file   ? Years of education: Not on file   ? Highest education level: Not on file   Occupational History   ? Not on file   Social Needs   ? Financial resource strain: Not on file   ? Food insecurity:     Worry: Not on file     Inability: Not on file   ? Transportation needs:     Medical: Not " on file     Non-medical: Not on file   Tobacco Use   ? Smoking status: Former Smoker     Packs/day: 1.00     Last attempt to quit: 1977     Years since quittin.2   ? Smokeless tobacco: Never Used   ? Tobacco comment: Quit smoking many years ago   Substance and Sexual Activity   ? Alcohol use: No   ? Drug use: No   ? Sexual activity: Not Currently   Lifestyle   ? Physical activity:     Days per week: Not on file     Minutes per session: Not on file   ? Stress: Not on file   Relationships   ? Social connections:     Talks on phone: Not on file     Gets together: Not on file     Attends Christian service: Not on file     Active member of club or organization: Not on file     Attends meetings of clubs or organizations: Not on file     Relationship status: Not on file   ? Intimate partner violence:     Fear of current or ex partner: Not on file     Emotionally abused: Not on file     Physically abused: Not on file     Forced sexual activity: Not on file   Other Topics Concern   ? Not on file   Social History Narrative   ? Not on file         Review of Systems as above    Weight is 81 pounds O2 sats 91% blood pressure 125/70 respirations 18 temperature 96.2 heart rate 96  Physical Exam  Patient is alert pleasantly confused and minimally conversant.  No sign of sedation.  No sign of pain or discomfort lying in bed.  Back is kyphotic she complains of no tenderness with pain in the thoracolumbar bony prominences as well as the paraspinous musculature nothing new  LABS:   Nothing new    ASSESSMENT:      ICD-10-CM    1. Compression fracture of body of thoracic vertebra (H) S22.000A        Assessment/plan    New T6 superior/inferior endplate compression fracture with 50% height loss  Age-indeterminate T8 compression fracture with 70% height loss  Old T10 T12 L3-L4 compression fractures  Vitamin D deficiency  Osteoporosis        Staff reports that she has no pain at rest.  Her pain seems better when up by quite a margin  over the last week.  I discussed with  her therapist whether we should try to use the TLSO again, perhaps in discussion with the family.  For now we do not reinstitute that order since she seems to be at relatively low risk of rotational injury  - PRN oxycodone, continue scheduled acetaminophen for now but likely back off the PRN if her pain continues.  Likewise with the topical lidocaine continue as current for now but may be able to back off in the foreseeable future  - Remains off  TLSO brace for now though it is an option to reinstitute it depending on course     Osteoporosis  Vitamin D deficiency     She is getting 50,000 units weekly which is likely appropriate though we did not check a vitamin D level yet.  May not be necessary to check levels before going with a more comfort care approach.  It is not clear to me that she is rehabbable-time will tell.  -Consider palliative care/hospice depending on patient's ability to rehab     Cachexia     BMI 14.66.  Dietary consult, high-protein supplements continue assist with feeding          Electronically signed by: Joshua Ott MD

## 2021-06-20 NOTE — LETTER
Letter by Flory Stubbs MD at      Author: Flory Stubbs MD Service: -- Author Type: --    Filed:  Encounter Date: 2/6/2020 Status: (Other)         Patient: Ade Bob   MR Number: [id number]   YOB: 1927   Date of Visit: 2/6/2020     [facility]      Facility:    [facility] [[id number]]  Code Status: DNR       Chief Complaint/Reason for Visit:  Chief Complaint   Patient presents with   ? H & P     Admit note to TCU for back pain, thoracic compression fractures.         HPI:   Ade is a 89 y.o. female with hx of dementia, prior compression fractures, presented to the hospital on 1/28/20 with back pain.     From Hospital HPI:  Ade Bob is a 89 y.o. old female with h/o pertinent history of hyperlipdemia, dementia, back pain, alzheimer's disease, and malnutrition who presents to this ED via [facility] with family for evaluation of back pain.  Family present and provides information, her back pain is in the middle of her back, constant, and unchanging. Per family, patient lives at lone but family frequently visits to check on her. No one has noticed any recent fall or trauma and patient denies, but she has dementia.  Patient was in the hospital 3 years ago for fractured T10-L3 vertebrae, but has otherwise been healthy. Shes had severe back pain.     From Hospital DC Summary:  Ade Bob is a 89 y.o. F who lives independently, with PMH of HLD, dementia, back pain, alzheimer's disease, underweight, malnutrition, T10, T12, L3, [id number] fractures for which she was hospitalized 3 years ago, who presents to this ED via [facility] with family for evaluation of back pain.   She was diagnosed with acute superior and inferior endplate compression fractures of T6 with 50% vertebral body height loss, minimal retropulsion into spinal canal and old T8 compression fractures. She was seen by neurosurgery and a  [employer] TLSO brace was placed 2020.  Pain management with scheduled Tylenol, topical lidocaine and low-dose, 2.5 mg every 4 hours as needed oxycodone.  MiraLAX/senna for opioid-induced constipation prophylaxis.  PT/OT evaluated, recommended TCU.      Vitamin D insufficiency, level low at 22.4 on 17.  No most recent vitamin D level recheck.  On Fosamax.  Added vitamin D and multivitamins.  Check vitamin D level as outpatient.     Primary insomnia-started low-dose 5 mg trazodone.     Moderate protein calorie malnutrition, failure to thrive in adult, underweight, Body mass index is 14.23 kg/m .  Patient was seen by RD.  Commended multivitamins, Magic cup twice a day with meals.     Overall stabilized and discharged to TCU on 20 for PT, OT, nursing cares, medical management and monitoring.      Past Medical History:  Past Medical History:   Diagnosis Date   ? Alzheimer's dementia     per family report   ? Back pain 2017   ? Dementia    ? Hyperlipidemia            Surgical History:  Past Surgical History:   Procedure Laterality Date   ? NO PAST SURGERIES         Family History:   Unable to recall secondary to dementia.       Social History:    Social History     Socioeconomic History   ? Marital status: Single     Spouse name: Not on file   ? Number of children: Not on file   ? Years of education: Not on file   ? Highest education level: Not on file   Occupational History   ? Not on file   Social Needs   ? Financial resource strain: Not on file   ? Food insecurity:     Worry: Not on file     Inability: Not on file   ? Transportation needs:     Medical: Not on file     Non-medical: Not on file   Tobacco Use   ? Smoking status: Former Smoker     Packs/day: 1.00     Last attempt to quit: 1977     Years since quittin.1   ? Smokeless tobacco: Never Used   ? Tobacco comment: Quit smoking many years ago   Substance and Sexual Activity   ? Alcohol use: No   ? Drug use: No   ? Sexual activity:  Not Currently   Lifestyle   ? Physical activity:     Days per week: Not on file     Minutes per session: Not on file   ? Stress: Not on file   Relationships   ? Social connections:     Talks on phone: Not on file     Gets together: Not on file     Attends Adventism service: Not on file     Active member of club or organization: Not on file     Attends meetings of clubs or organizations: Not on file     Relationship status: Not on file   ? Intimate partner violence:     Fear of current or ex partner: Not on file     Emotionally abused: Not on file     Physically abused: Not on file     Forced sexual activity: Not on file   Other Topics Concern   ? Not on file   Social History Narrative   ? Not on file       Medications:  Current Outpatient Medications   Medication Sig   ? acetaminophen (TYLENOL) 325 MG tablet Take 2 tablets (650 mg total) by mouth 3 (three) times a day.   ? alendronate (FOSAMAX) 70 MG tablet Take 70 mg by mouth once a week. Saturdays   ? aluminum-magnesium hydroxide-simethicone (MAALOX ADVANCED) 200-200-20 mg/5 mL Susp Take 30 mL by mouth every 4 (four) hours as needed.   ? ergocalciferol (ERGOCALCIFEROL) 1,250 mcg (50,000 unit) capsule Take 1 capsule (50,000 Units total) by mouth once a week.   ? lidocaine (XYLOCAINE) 5 % ointment Apply to mid back tid   ? multivitamin therapeutic tablet Take 1 tablet by mouth daily.   ? oxyCODONE (ROXICODONE) 5 MG immediate release tablet Take 0.5-1 tablets (2.5-5 mg total) by mouth every 4 (four) hours as needed.   ? polyethylene glycol (MIRALAX) 17 gram packet Take 1 packet (17 g total) by mouth daily.   ? senna-docusate (PERICOLACE) 8.6-50 mg tablet Take 1 tablet by mouth 2 (two) times a day.   ? traZODone (DESYREL) 50 MG tablet Take 0.5 tablets (25 mg total) by mouth at bedtime.       Allergies:  Allergies   Allergen Reactions   ? Cefdinir Nausea Only   ? Sulfa (Sulfonamide Antibiotics) Unknown and Hives        Review of Systems:  Pertinent items are noted in  HPI.      Physical Exam:   General: Patient is alert elderly female, no distress. Thin and frail.   Vitals: /71, Temp 97.8, Pulse 90, RR 18, O2 sat 92%RA.  HEENT: Head is NCAT. Eyes show no injection or icterus. Nares negative. Oropharynx well hydrated.  Neck: Supple. No tenderness or adenopathy. No JVD.  Lungs: Clear bilaterally. No wheezes.  Cardiovascular: Regular rate and rhythm, normal S1. S2.  Back: No spinal or CVA tenderness.  Abdomen: Soft, no tenderness on exam. Bowel sounds present. No guarding rebound or rigidity.  : Deferred.  Extremities: No edema is noted.  Musculoskeletal: Age related degen changes.   Skin: Warm and dry.   Psych: Mood appears good though she is tired.      Labs:  Lab Results   Component Value Date    WBC 7.3 02/06/2020    HGB 11.4 (L) 02/06/2020    HCT 35.3 02/06/2020    MCV 99 02/06/2020     02/06/2020     Results for orders placed or performed in visit on 02/06/20   Basic Metabolic Panel   Result Value Ref Range    Sodium 138 136 - 145 mmol/L    Potassium 3.9 3.5 - 5.0 mmol/L    Chloride 100 98 - 107 mmol/L    CO2 33 (H) 22 - 31 mmol/L    Anion Gap, Calculation 5 5 - 18 mmol/L    Glucose 82 70 - 125 mg/dL    Calcium 8.9 8.5 - 10.5 mg/dL    BUN 11 8 - 28 mg/dL    Creatinine 0.46 (L) 0.60 - 1.10 mg/dL    GFR MDRD Af Amer >60 >60 mL/min/1.73m2    GFR MDRD Non Af Amer >60 >60 mL/min/1.73m2         Assessment/Plan:  1. Acute on chronic back pain. Acute T6 fx with old stable fxs T8, T10, L3, L4. Has TLSO. On tylenol with low dose prn oxycodone.  2. Alzheimer dementia. Lives at home alone with help from family.  involved, family reportedly now considering LTC which is likely recommended.  3. Vitamin D insufficieny. Started on replacement.  4. Insomnia. Sleeping well with trazodone.  5. Malnutrition. Dietary to assess. On multivitamin.  6. Osteoporosis. On Alendronate.  7. Code status is DNR.       Total time greater than 40 minutes, greater than 50% counseling  and coordination of care, time spent in interview and examination of patient, discussion with nursing staff. CC time includes time with patient and family to review situation, disease states with old and new fractures, dementia and malnutrition, not recommended to live alone despite much support from family, she is alone overnight and family now considering placement. Review of management for pain and fractures, follow up if needed with spine.         Electronically signed by: Flory Stubbs MD

## 2021-06-20 NOTE — LETTER
Letter by Flory Stubbs MD at      Author: Flory Stubbs MD Service: -- Author Type: --    Filed:  Encounter Date: 2/13/2020 Status: (Other)         Patient: Ade Bob   MR Number: [id number]   YOB: 1927   Date of Visit: 2/13/2020     [facility]    Facility:   [facility] [[id number]]   Code Status: DNR       Chief Complaint   Patient presents with   ? Follow Up     TCU 2/13/20.       TCU HPI:   Ade is a 89 y.o. female with hx of dementia, prior compression fractures, presented to the hospital on 1/28/20 with back pain.     From Hospital HPI:  Ade Bob is a 89 y.o. old female with h/o pertinent history of hyperlipdemia, dementia, back pain, alzheimer's disease, and malnutrition who presents to this ED via [facility] with family for evaluation of back pain.  Family present and provides information, her back pain is in the middle of her back, constant, and unchanging. Per family, patient lives at lone but family frequently visits to check on her. No one has noticed any recent fall or trauma and patient denies, but she has dementia.  Patient was in the hospital 3 years ago for fractured T10-L3 vertebrae, but has otherwise been healthy. Shes had severe back pain.     From Hospital DC Summary:  Ade Bob is a 89 y.o. F who lives independently, with PMH of HLD, dementia, back pain, alzheimer's disease, underweight, malnutrition, T10, T12, L3, [id number] fractures for which she was hospitalized 3 years ago, who presents to this ED via [facility] with family for evaluation of back pain.   She was diagnosed with acute superior and inferior endplate compression fractures of T6 with 50% vertebral body height loss, minimal retropulsion into spinal canal and old T8 compression fractures. She was seen by neurosurgery and a [place] [employer] TLSO brace was placed 1/29/2020.  Pain management with scheduled  Tylenol, topical lidocaine and low-dose, 2.5 mg every 4 hours as needed oxycodone.  MiraLAX/senna for opioid-induced constipation prophylaxis.  PT/OT evaluated, recommended TCU.      Vitamin D insufficiency, level low at 22.4 on 1/16/17.  No most recent vitamin D level recheck.  On Fosamax.  Added vitamin D and multivitamins.  Check vitamin D level as outpatient.     Primary insomnia-started low-dose 5 mg trazodone.     Moderate protein calorie malnutrition, failure to thrive in adult, underweight, Body mass index is 14.23 kg/m .  Patient was seen by RD.  Commended multivitamins, Magic cup twice a day with meals.     Overall stabilized and discharged to TCU on 1/31/20 for PT, OT, nursing cares, medical management and monitoring.      Today:  Seen today for reports of unmanaged pain. She has chronic and new compression fractures, has brace to wear. Reports significant back pain, difficult to participate in therapy, depends how she is positioned.. Has scheduled tylenol and prn oxycodone, reviewed with nursing to be sure she is getting it and to watch for side effects, confusion etc. Seems to be tolerating yet not effective. Will add gabapentin in hopes to help with pain management and therapies. She is very frail. Appetite is fair. No other concerns. She denies fever, cough, congestion, dizziness, shortness of breath or chest pain. No diarrhea or constipation. Once positioned well in bed, she is able to sleep per her report.       Past Medical History:  Past Medical History:   Diagnosis Date   ? Alzheimer's dementia     per family report   ? Back pain 01/14/2017   ? Dementia    ? Hyperlipidemia        Medications:  Current Outpatient Medications   Medication Sig   ? acetaminophen (TYLENOL) 325 MG tablet Take 2 tablets (650 mg total) by mouth 3 (three) times a day.   ? alendronate (FOSAMAX) 70 MG tablet Take 70 mg by mouth once a week. Saturdays   ? aluminum-magnesium hydroxide-simethicone (MAALOX ADVANCED) 200-200-20  mg/5 mL Susp Take 30 mL by mouth every 4 (four) hours as needed.   ? ergocalciferol (ERGOCALCIFEROL) 1,250 mcg (50,000 unit) capsule Take 1 capsule (50,000 Units total) by mouth once a week.   ? lidocaine (XYLOCAINE) 5 % ointment Apply to mid back tid   ? multivitamin therapeutic tablet Take 1 tablet by mouth daily.   ? oxyCODONE (ROXICODONE) 5 MG immediate release tablet Take 0.5-1 tablets (2.5-5 mg total) by mouth every 4 (four) hours as needed.   ? polyethylene glycol (MIRALAX) 17 gram packet Take 1 packet (17 g total) by mouth daily.   ? senna-docusate (PERICOLACE) 8.6-50 mg tablet Take 1 tablet by mouth 2 (two) times a day.   ? traZODone (DESYREL) 50 MG tablet Take 0.5 tablets (25 mg total) by mouth at bedtime.       Physical Exam:   General: Patient is alert elderly female, no distress. Thin and frail.   Vitals: /68, Temp 98.1, Pulse 85, RR 18, O2 sat 93%RA.  HEENT: Head is NCAT. Eyes show no injection or icterus. Nares negative. Oropharynx well hydrated.  Neck: Supple. No tenderness or adenopathy. No JVD.  Lungs: Clear bilaterally. No wheezes.  Cardiovascular: Regular rate and rhythm, normal S1. S2.  Back: No spinal or CVA tenderness. Kyphosis.   Abdomen: Soft, no tenderness on exam. Bowel sounds present. No guarding rebound or rigidity.  Extremities: No edema is noted.  Musculoskeletal: Age related degen changes.   Skin: Warm and dry.         Labs:  Lab Results   Component Value Date    WBC 7.3 02/06/2020    HGB 11.4 (L) 02/06/2020    HCT 35.3 02/06/2020    MCV 99 02/06/2020     02/06/2020     Results for orders placed or performed in visit on 02/06/20   Basic Metabolic Panel   Result Value Ref Range    Sodium 138 136 - 145 mmol/L    Potassium 3.9 3.5 - 5.0 mmol/L    Chloride 100 98 - 107 mmol/L    CO2 33 (H) 22 - 31 mmol/L    Anion Gap, Calculation 5 5 - 18 mmol/L    Glucose 82 70 - 125 mg/dL    Calcium 8.9 8.5 - 10.5 mg/dL    BUN 11 8 - 28 mg/dL    Creatinine 0.46 (L) 0.60 - 1.10 mg/dL    GFR  MDRD Af Amer >60 >60 mL/min/1.73m2    GFR MDRD Non Af Amer >60 >60 mL/min/1.73m2         Assessment/Plan:  1. Acute on chronic back pain. Acute T6 fx with old stable fxs T8, T10, L3, L4. Has TLSO. On tylenol with low dose prn oxycodone, continue those with increased nursing assessments, add gabapentin low dose 100mg three times daily, watch closely for side effects, altered mental cameron in elderly with hx of dementia. Has appt next week for xrays and then later in week MD appt for follow up.  2. Alzheimer dementia.   3. Vitamin D insufficieny. On replacement.  4. Insomnia. Continue trazodone.  5. Osteoporosis. On Alendronate.          Electronically signed by: Flory Stubbs MD

## 2021-06-20 NOTE — LETTER
"Letter by Ofelia Ballesteros CNP at      Author: Ofelia Ballesteros CNP Service: -- Author Type: --    Filed:  Encounter Date: 2/18/2020 Status: (Other)         Patient: Ade Bob   MR Number: [id number]   YOB: 1927   Date of Visit: 2/18/2020     [facility]    Facility:   [facility] [[id number]]   Code Status: DNR      CHIEF COMPLAINT/REASON FOR VISIT:  Chief Complaint   Patient presents with   ? Review Of Multiple Medical Conditions       HISTORY:      HPI: Ade is a 89 y.o. female undergoing physical and occupational therapy at [facility]. She is  with PMH of HLD, dementia, back pain, alzheimer's disease, underweight, malnutrition, T10, T12, L3, [id number] fractures for which she was hospitalized 3 years ago, who presents to the ED via [facility] with family for evaluation of back pain.   She was diagnosed with acute superior and inferior endplate compression fractures of T6 with 50% vertebral body height loss, minimal retropulsion into spinal canal and old T8 compression fractures. She was seen by neurosurgery and a [place] [employer] TLSO brace was placed 1/29/2020.        Today she is seen for a routine visit to review multiple medical issues.  She did have a follow-up back x-rays yesterday and will follow up with MD on Thursday.  She does wear a  TLSO brace when out of bed.  She is alert to name only.  Today when asked about her pain she responded, \"pain is not too bad.\"  This is no because normally she would say her pain is very bad.  Her pain medications were recently adjusted and a UA was done which came back negative for UTI.  She denied chest pain or shortness of breath.  She denied cough or congestion.  She reports she is moving her bowels.    It appears she will need long-term care placement when she discharges.       Past Medical History:   Diagnosis Date   ? Alzheimer's dementia     per family report   ? Back " pain 2017   ? Dementia    ? Hyperlipidemia              No family history on file.  Social History     Socioeconomic History   ? Marital status: Single     Spouse name: Not on file   ? Number of children: Not on file   ? Years of education: Not on file   ? Highest education level: Not on file   Occupational History   ? Not on file   Social Needs   ? Financial resource strain: Not on file   ? Food insecurity:     Worry: Not on file     Inability: Not on file   ? Transportation needs:     Medical: Not on file     Non-medical: Not on file   Tobacco Use   ? Smoking status: Former Smoker     Packs/day: 1.00     Last attempt to quit: 1977     Years since quittin.1   ? Smokeless tobacco: Never Used   ? Tobacco comment: Quit smoking many years ago   Substance and Sexual Activity   ? Alcohol use: No   ? Drug use: No   ? Sexual activity: Not Currently   Lifestyle   ? Physical activity:     Days per week: Not on file     Minutes per session: Not on file   ? Stress: Not on file   Relationships   ? Social connections:     Talks on phone: Not on file     Gets together: Not on file     Attends Mandaen service: Not on file     Active member of club or organization: Not on file     Attends meetings of clubs or organizations: Not on file     Relationship status: Not on file   ? Intimate partner violence:     Fear of current or ex partner: Not on file     Emotionally abused: Not on file     Physically abused: Not on file     Forced sexual activity: Not on file   Other Topics Concern   ? Not on file   Social History Narrative   ? Not on file         Review of Systems   Constitutional: Positive for activity change. Negative for appetite change, fatigue and fever.   HENT: Negative.  Negative for congestion.    Eyes: Negative.    Respiratory: Negative.  Negative for cough, shortness of breath and wheezing.    Cardiovascular: Negative.  Negative for chest pain and leg swelling.   Gastrointestinal: Negative.  Negative for  abdominal distention, abdominal pain, constipation, diarrhea and nausea.   Endocrine: Negative.    Genitourinary: Negative.  Negative for dysuria.   Musculoskeletal: Positive for back pain. Negative for arthralgias.   Skin: Negative.  Negative for color change and wound.   Neurological: Negative.  Negative for dizziness.   Hematological: Negative.    Psychiatric/Behavioral: Negative.  Negative for agitation, behavioral problems and confusion.       Vitals:    02/18/20 1029   BP: 129/70   Pulse: 82   Resp: 16   Temp: (!) 96.5  F (35.8  C)   SpO2: 92%   Weight: (!) 88 lb 6.4 oz (40.1 kg)       Physical Exam  Constitutional:       Appearance: She is well-developed.   HENT:      Head: Normocephalic.   Eyes:      Conjunctiva/sclera: Conjunctivae normal.   Neck:      Musculoskeletal: Normal range of motion.   Cardiovascular:      Rate and Rhythm: Normal rate and regular rhythm.      Heart sounds: Normal heart sounds. No murmur.   Pulmonary:      Effort: No respiratory distress.      Breath sounds: Normal breath sounds. No wheezing or rales.   Abdominal:      General: Bowel sounds are normal. There is no distension.      Palpations: Abdomen is soft.      Tenderness: There is no abdominal tenderness.   Musculoskeletal: Normal range of motion.      Comments: Patient wearing a TLSO brace   Skin:     General: Skin is warm.   Neurological:      Mental Status: She is alert.      Comments: Alert and oriented to self   Psychiatric:         Behavior: Behavior normal.           LABS:   Recent Results (from the past 240 hour(s))   Urinalysis   Result Value Ref Range    Color, UA Yellow Colorless, Yellow, Straw, Light Yellow    Clarity, UA Cloudy (!) Clear    Glucose, UA Negative Negative    Bilirubin, UA Negative Negative    Ketones, UA Negative Negative    Specific Gravity, UA 1.017 1.001 - 1.030    Blood, UA Negative Negative    pH, UA 7.5 4.5 - 8.0    Protein, UA Trace (!) Negative mg/dL    Urobilinogen, UA <2.0 E.U./dL <2.0  E.U./dL, 2.0 E.U./dL    Nitrite, UA Negative Negative    Leukocytes, UA Negative Negative    Bacteria, UA None Seen None Seen hpf    RBC, UA 0-2 None Seen, 0-2 hpf    WBC, UA 0-5 None Seen, 0-5 hpf    Squam Epithel, UA 0-5 None Seen, 0-5 lpf    Amorphous, UA Moderate (!) None Seen    Mucus, UA Few (!) None Seen lpf   Culture, Urine   Result Value Ref Range    Culture No Growth        ASSESSMENT:      ICD-10-CM    1. Acute midline thoracic back pain M54.6    2. Malnutrition, unspecified type (H) E46    3. Closed stable burst fracture of eighth thoracic vertebra, sequela S22.061S    4. Closed stable burst fracture of sixth thoracic vertebra with routine healing, subsequent encounter S22.145S    5. Pain management R52        PLAN:    T6, T8 burst fracture PT/OT, on Alendronate weekly pain control    Malnutrition dietary following, daily weights     Vit d deficiency- 2000 IU daily recheck lab in one month , Last Vit D 43.2 on 2/5/20    Insomnia On Trazadone    Pain management patient on Tylenol 1000 mg every 6 hours scheduled.      Electronically signed by: Ofelia Ballesteros CNP

## 2021-06-20 NOTE — LETTER
Letter by Ofelia Ballesteros CNP at      Author: Ofelia Ballesteros CNP Service: -- Author Type: --    Filed:  Encounter Date: 2/10/2020 Status: (Other)         Patient: Ade Bob   MR Number: [id number]   YOB: 1927   Date of Visit: 2/10/2020     [facility]    Facility:   [facility] [[id number]]   Code Status: DNR      CHIEF COMPLAINT/REASON FOR VISIT:  Chief Complaint   Patient presents with   ? Review Of Multiple Medical Conditions       HISTORY:      HPI: Ade is a 89 y.o. female undergoing physical and occupational therapy at [facility]. She is  with PMH of HLD, dementia, back pain, alzheimer's disease, underweight, malnutrition, T10, T12, L3, [id number] fractures for which she was hospitalized 3 years ago, who presents to the ED via [facility] with family for evaluation of back pain.   She was diagnosed with acute superior and inferior endplate compression fractures of T6 with 50% vertebral body height loss, minimal retropulsion into spinal canal and old T8 compression fractures. She was seen by neurosurgery and a [place] [employer] TLSO brace was placed 1/29/2020.        Today she is seen for reports of increased pain low back.  Per staff patient constantly screaming out even when she is not touched.  Patient was seen in her room and was verbalizing pain in her back.  When asked where it hurts she pointed to her mid back.  She did have a positive CVA on the left but negative on the right.  a urine sample was checked and it came back negative.  Patient's Tylenol was increased from 650 mg 3 times daily to 1000 mg every 6 hours.  She is also on as needed oxycodone and a lidocaine ointment to her low back.    She denied chest pain or shortness of breath.  She denied cough or congestion.  She reports she is moving her bowels.    It appears she will need long-term care placement when she discharges.    She is wearing a TLSO  brace when out of bed.  She was oriented to self.  .    Past Medical History:   Diagnosis Date   ? Alzheimer's dementia     per family report   ? Back pain 2017   ? Dementia    ? Hyperlipidemia              No family history on file.  Social History     Socioeconomic History   ? Marital status: Single     Spouse name: Not on file   ? Number of children: Not on file   ? Years of education: Not on file   ? Highest education level: Not on file   Occupational History   ? Not on file   Social Needs   ? Financial resource strain: Not on file   ? Food insecurity:     Worry: Not on file     Inability: Not on file   ? Transportation needs:     Medical: Not on file     Non-medical: Not on file   Tobacco Use   ? Smoking status: Former Smoker     Packs/day: 1.00     Last attempt to quit: 1977     Years since quittin.1   ? Smokeless tobacco: Never Used   ? Tobacco comment: Quit smoking many years ago   Substance and Sexual Activity   ? Alcohol use: No   ? Drug use: No   ? Sexual activity: Not Currently   Lifestyle   ? Physical activity:     Days per week: Not on file     Minutes per session: Not on file   ? Stress: Not on file   Relationships   ? Social connections:     Talks on phone: Not on file     Gets together: Not on file     Attends Baptism service: Not on file     Active member of club or organization: Not on file     Attends meetings of clubs or organizations: Not on file     Relationship status: Not on file   ? Intimate partner violence:     Fear of current or ex partner: Not on file     Emotionally abused: Not on file     Physically abused: Not on file     Forced sexual activity: Not on file   Other Topics Concern   ? Not on file   Social History Narrative   ? Not on file         Review of Systems   Constitutional: Positive for activity change. Negative for appetite change, fatigue and fever.   HENT: Negative.  Negative for congestion.    Eyes: Negative.    Respiratory: Negative.  Negative for cough,  shortness of breath and wheezing.    Cardiovascular: Negative.  Negative for chest pain and leg swelling.   Gastrointestinal: Negative.  Negative for abdominal distention, abdominal pain, constipation, diarrhea and nausea.   Endocrine: Negative.    Genitourinary: Negative.  Negative for dysuria.   Musculoskeletal: Positive for back pain. Negative for arthralgias.   Skin: Negative.  Negative for color change and wound.   Neurological: Negative.  Negative for dizziness.   Hematological: Negative.    Psychiatric/Behavioral: Negative.  Negative for agitation, behavioral problems and confusion.       Vitals:    02/10/20 1210   BP: (!) 154/93   Pulse: 99   Resp: 18   Temp: 97.4  F (36.3  C)   SpO2: 94%   Weight: (!) 88 lb 6.4 oz (40.1 kg)       Physical Exam  Constitutional:       Appearance: She is well-developed.   HENT:      Head: Normocephalic.   Eyes:      Conjunctiva/sclera: Conjunctivae normal.   Neck:      Musculoskeletal: Normal range of motion.   Cardiovascular:      Rate and Rhythm: Normal rate and regular rhythm.      Heart sounds: Normal heart sounds. No murmur.   Pulmonary:      Effort: No respiratory distress.      Breath sounds: Normal breath sounds. No wheezing or rales.   Abdominal:      General: Bowel sounds are normal. There is no distension.      Palpations: Abdomen is soft.      Tenderness: There is no abdominal tenderness.   Musculoskeletal: Normal range of motion.      Comments: Patient wearing a TLSO brace   Skin:     General: Skin is warm.   Neurological:      Mental Status: She is alert.      Comments: Alert and oriented to self   Psychiatric:         Behavior: Behavior normal.           LABS:   Recent Results (from the past 240 hour(s))   Vitamin D, Total (25-Hydroxy)   Result Value Ref Range    Vitamin D, Total (25-Hydroxy) 43.2 30.0 - 80.0 ng/mL   Basic Metabolic Panel   Result Value Ref Range    Sodium 138 136 - 145 mmol/L    Potassium 3.9 3.5 - 5.0 mmol/L    Chloride 100 98 - 107 mmol/L     CO2 33 (H) 22 - 31 mmol/L    Anion Gap, Calculation 5 5 - 18 mmol/L    Glucose 82 70 - 125 mg/dL    Calcium 8.9 8.5 - 10.5 mg/dL    BUN 11 8 - 28 mg/dL    Creatinine 0.46 (L) 0.60 - 1.10 mg/dL    GFR MDRD Af Amer >60 >60 mL/min/1.73m2    GFR MDRD Non Af Amer >60 >60 mL/min/1.73m2   HM2(CBC w/o Differential)   Result Value Ref Range    WBC 7.3 4.0 - 11.0 thou/uL    RBC 3.56 (L) 3.80 - 5.40 mill/uL    Hemoglobin 11.4 (L) 12.0 - 16.0 g/dL    Hematocrit 35.3 35.0 - 47.0 %    MCV 99 80 - 100 fL    MCH 32.0 27.0 - 34.0 pg    MCHC 32.3 32.0 - 36.0 g/dL    RDW 14.4 11.0 - 14.5 %    Platelets 338 140 - 440 thou/uL    MPV 8.8 8.5 - 12.5 fL   Urinalysis   Result Value Ref Range    Color, UA Yellow Colorless, Yellow, Straw, Light Yellow    Clarity, UA Cloudy (!) Clear    Glucose, UA Negative Negative    Bilirubin, UA Negative Negative    Ketones, UA Negative Negative    Specific Gravity, UA 1.017 1.001 - 1.030    Blood, UA Negative Negative    pH, UA 7.5 4.5 - 8.0    Protein, UA Trace (!) Negative mg/dL    Urobilinogen, UA <2.0 E.U./dL <2.0 E.U./dL, 2.0 E.U./dL    Nitrite, UA Negative Negative    Leukocytes, UA Negative Negative    Bacteria, UA None Seen None Seen hpf    RBC, UA 0-2 None Seen, 0-2 hpf    WBC, UA 0-5 None Seen, 0-5 hpf    Squam Epithel, UA 0-5 None Seen, 0-5 lpf    Amorphous, UA Moderate (!) None Seen    Mucus, UA Few (!) None Seen lpf       ASSESSMENT:      ICD-10-CM    1. Acute midline thoracic back pain M54.6    2. Malnutrition, unspecified type (H) E46    3. Closed stable burst fracture of sixth thoracic vertebra with routine healing, subsequent encounter S22.351A    4. Closed stable burst fracture of eighth thoracic vertebra, sequela S22.061S    5. Pain management R52        PLAN:    T6, T8 burst fracture PT/OT, on Alendronate weekly pain control    Malnutrition dietary consult , daily weights     Vit d deficiency- 2000 IU daily recheck lab in one month , Last Vit D 43.2 on 2/5/20    Insomnia On  Trazadone    Pain management Tylenol 650 MG three times a day dc'd and patient started on 1000 mg every 6 hours scheduled.    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

## 2021-06-20 NOTE — LETTER
Letter by Ofelia Ballesteros CNP at      Author: Ofelia Ballesteros CNP Service: -- Author Type: --    Filed:  Encounter Date: 2/5/2020 Status: (Other)         Patient: Ade Bob   MR Number: [id number]   YOB: 1927   Date of Visit: 2/5/2020     [facility]    Facility:   [facility] [[id number]]   Code Status: DNR      CHIEF COMPLAINT/REASON FOR VISIT:  Chief Complaint   Patient presents with   ? Problem Visit       HISTORY:      HPI: Ade is a 89 y.o. female undergoing physical and occupational therapy at [facility]. She is  with PMH of HLD, dementia, back pain, alzheimer's disease, underweight, malnutrition, T10, T12, L3, [id number] fractures for which she was hospitalized 3 years ago, who presents to the ED via [facility] with family for evaluation of back pain.   She was diagnosed with acute superior and inferior endplate compression fractures of T6 with 50% vertebral body height loss, minimal retropulsion into spinal canal and old T8 compression fractures. She was seen by neurosurgery and a [employer] TLSO brace was placed 1/29/2020.        Today she is seen for review of medication.  Currently she is on 50,000 weekly  of vitamin D .  A level was checked and she was within normal range at 43.2.  She was switched to a maintenance dose of 2000 international units daily.  Patient did report feeling dizzy and weak and labs to be checked in the a.m. her last blood work was done on 128 and was within normal limits.  It appears today she did not eat much of her breakfast at all she normally needs cues that remind her that she needs to eat.   She denied chest pain or shortness of breath.  She denied cough or congestion.  She reports she is moving her bowels.    It appears she will need long-term care placement when she discharges.    She is wearing a TLSO brace.  She was oriented to self.  She denied pain .    Past Medical  History:   Diagnosis Date   ? Alzheimer's dementia     per family report   ? Back pain 2017   ? Dementia    ? Hyperlipidemia              No family history on file.  Social History     Socioeconomic History   ? Marital status: Single     Spouse name: Not on file   ? Number of children: Not on file   ? Years of education: Not on file   ? Highest education level: Not on file   Occupational History   ? Not on file   Social Needs   ? Financial resource strain: Not on file   ? Food insecurity:     Worry: Not on file     Inability: Not on file   ? Transportation needs:     Medical: Not on file     Non-medical: Not on file   Tobacco Use   ? Smoking status: Former Smoker     Packs/day: 1.00     Last attempt to quit: 1977     Years since quittin.1   ? Smokeless tobacco: Never Used   ? Tobacco comment: Quit smoking many years ago   Substance and Sexual Activity   ? Alcohol use: No   ? Drug use: No   ? Sexual activity: Not Currently   Lifestyle   ? Physical activity:     Days per week: Not on file     Minutes per session: Not on file   ? Stress: Not on file   Relationships   ? Social connections:     Talks on phone: Not on file     Gets together: Not on file     Attends Pentecostalism service: Not on file     Active member of club or organization: Not on file     Attends meetings of clubs or organizations: Not on file     Relationship status: Not on file   ? Intimate partner violence:     Fear of current or ex partner: Not on file     Emotionally abused: Not on file     Physically abused: Not on file     Forced sexual activity: Not on file   Other Topics Concern   ? Not on file   Social History Narrative   ? Not on file         Review of Systems   Constitutional: Positive for activity change. Negative for appetite change, fatigue and fever.   HENT: Negative.  Negative for congestion.    Eyes: Negative.    Respiratory: Negative.  Negative for cough, shortness of breath and wheezing.    Cardiovascular: Negative.   Negative for chest pain and leg swelling.   Gastrointestinal: Negative.  Negative for abdominal distention, abdominal pain, constipation, diarrhea and nausea.   Endocrine: Negative.    Genitourinary: Negative.  Negative for dysuria.   Musculoskeletal: Positive for back pain. Negative for arthralgias.   Skin: Negative.  Negative for color change and wound.   Neurological: Negative.  Negative for dizziness.   Hematological: Negative.    Psychiatric/Behavioral: Negative.  Negative for agitation, behavioral problems and confusion.       Vitals:    02/05/20 0830   BP: (!) 149/92   Pulse: 87   Resp: 18   Temp: 98.5  F (36.9  C)   SpO2: 90%   Weight: (!) 88 lb 6.4 oz (40.1 kg)       Physical Exam  Constitutional:       Appearance: She is well-developed.   HENT:      Head: Normocephalic.   Eyes:      Conjunctiva/sclera: Conjunctivae normal.   Neck:      Musculoskeletal: Normal range of motion.   Cardiovascular:      Rate and Rhythm: Normal rate and regular rhythm.      Heart sounds: Normal heart sounds. No murmur.   Pulmonary:      Effort: No respiratory distress.      Breath sounds: Normal breath sounds. No wheezing or rales.   Abdominal:      General: Bowel sounds are normal. There is no distension.      Palpations: Abdomen is soft.      Tenderness: There is no abdominal tenderness.   Musculoskeletal: Normal range of motion.      Comments: Patient wearing a TLSO brace   Skin:     General: Skin is warm.   Neurological:      Mental Status: She is alert.      Comments: Alert and oriented to self   Psychiatric:         Behavior: Behavior normal.           LABS:   Recent Results (from the past 240 hour(s))   Basic Metabolic Panel   Result Value Ref Range    Sodium 141 136 - 145 mmol/L    Potassium 4.2 3.5 - 5.0 mmol/L    Chloride 103 98 - 107 mmol/L    CO2 22 22 - 31 mmol/L    Anion Gap, Calculation 16 5 - 18 mmol/L    Glucose 102 70 - 125 mg/dL    Calcium 10.0 8.5 - 10.5 mg/dL    BUN 19 8 - 28 mg/dL    Creatinine 0.64 0.60 -  1.10 mg/dL    GFR MDRD Af Amer >60 >60 mL/min/1.73m2    GFR MDRD Non Af Amer >60 >60 mL/min/1.73m2   HM2 (CBC W/O DIFF)   Result Value Ref Range    WBC 8.1 4.0 - 11.0 thou/uL    RBC 4.40 3.80 - 5.40 mill/uL    Hemoglobin 14.0 12.0 - 16.0 g/dL    Hematocrit 43.7 35.0 - 47.0 %    MCV 99 80 - 100 fL    MCH 31.8 27.0 - 34.0 pg    MCHC 32.0 32.0 - 36.0 g/dL    RDW 13.6 11.0 - 14.5 %    Platelets 371 140 - 440 thou/uL    MPV 8.7 8.5 - 12.5 fL   C-Reactive Protein   Result Value Ref Range    CRP 8.3 (H) 0.0 - 0.8 mg/dL   Vitamin D, Total (25-Hydroxy)   Result Value Ref Range    Vitamin D, Total (25-Hydroxy) 43.2 30.0 - 80.0 ng/mL       ASSESSMENT:      ICD-10-CM    1. Encounter for medication review and counseling Z71.89        PLAN:    T6, T8 burst fracture PT/OT, on Alendronate weekly pain control    Malnutrition dietary consult , daily weights     Vit d deficiency-50,000 weekly dc'd and pt started on 2000 IU daily recheck lab in one month , Last Vit D 43.2 on 2/5/20    Insomnia On Trazadone    Pain management Tylenol 650 MG TID    Electronically signed by: Ofelia Ballesteros CNP

## 2021-06-21 NOTE — LETTER
"Letter by Joshua Ott MD at      Author: Joshua Ott MD Service: -- Author Type: --    Filed:  Encounter Date: 11/19/2020 Status: (Other)         Patient: Ade Bob   MR Number: [id number]   YOB: 1927   Date of Visit: 11/19/2020              [facility]    Facility:  [facility] [[id number]]    Code Status:  DNR    Chief Complaint   Patient presents with   ? Review Of Multiple Medical Conditions   :                    Patient is seen today for a federally mandated regulatory visit                 Patient Active Problem List   Diagnosis   ? Back pain   ? Compression fracture of body of thoracic vertebra (H)   ? Cachexia (H)   ? Malnutrition (H)   ? Severe protein-calorie malnutrition (H)   ? Hyponatremia   ? Thoracic compression fracture, closed, initial encounter (H)   ? Alzheimer's dementia (H)   ? Advanced directives, counseling/discussion   ? Closed stable burst fracture of T6 vertebra (H)   ? Closed stable burst fracture of T8 vertebra (H)   ? Metabolic encephalopathy           Subjective/review of systems/history:  Ade Bob  is a 89 year old female with history of Alzheimer's dementia, osteoporosis with multiple spinal fractures, hyperlipidemia, malnutrition, vitamin D deficiency          Most recent hospital admission: January 28 through January 31 for acute on chronic back pain.  Work-up revealed new T6 compression fracture age-indeterminate T8 fracture and old T10 T12 L3-L4 fractures.  Patient was unable to tolerate TLSO.        History is obtained primarily from the staff.  They report that the patient eats \"almost nothing\" but that she really likes the supplement that she is given 3 times a day and always drinks that fully.  She also has a Magic cup type supplement which she does not like as well.  Dietitian has been involved in the past with her.  Mirtazapine was added and continued at 7.5 daily.  " Despite the poor overall eating, her weight has been stable.  Her bowels have been working.  Staff says she has been quite stable without any behaviors.  She can generally make her wishes known.  Her sleep has improved with the trazodone.  She has tapered off the oxycodone and still has lidocaine and acetaminophen as needed but staff reports she does not complain of back pain anymore.  She is tolerating the Fosamax.  She still on gabapentin 100 mg 3 times daily as well.  Remainder negative or unobtainable        Remainder 13 system ROS negative or unobtainable including no constipation fever sweats chills cough hypoxia falls injuries    Past Medical History:   Diagnosis Date   ? Alzheimer's dementia (H)     per family report   ? Back pain 2017   ? Dementia (H)    ? Hyperlipidemia      Past Surgical History:   Procedure Laterality Date   ? NO PAST SURGERIES            No family history on file.:   Unobtainable, patient unaware    Social History     Socioeconomic History   ? Marital status:      Spouse name: Not on file   ? Number of children: Not on file   ? Years of education: Not on file   ? Highest education level: Not on file   Occupational History   ? Not on file   Social Needs   ? Financial resource strain: Not on file   ? Food insecurity     Worry: Not on file     Inability: Not on file   ? Transportation needs     Medical: Not on file     Non-medical: Not on file   Tobacco Use   ? Smoking status: Former Smoker     Packs/day: 1.00     Quit date: 1977     Years since quittin.9   ? Smokeless tobacco: Never Used   ? Tobacco comment: Quit smoking many years ago   Substance and Sexual Activity   ? Alcohol use: No   ? Drug use: No   ? Sexual activity: Not Currently   Lifestyle   ? Physical activity     Days per week: Not on file     Minutes per session: Not on file   ? Stress: Not on file   Relationships   ? Social connections     Talks on phone: Not on file     Gets together: Not on file      Attends Gnosticist service: Not on file     Active member of club or organization: Not on file     Attends meetings of clubs or organizations: Not on file     Relationship status: Not on file   ? Intimate partner violence     Fear of current or ex partner: Not on file     Emotionally abused: Not on file     Physically abused: Not on file     Forced sexual activity: Not on file   Other Topics Concern   ? Not on file   Social History Narrative   ? Not on file   :        Current Outpatient Medications   Medication Sig   ? acetaminophen (TYLENOL) 325 MG tablet Take 650 mg by mouth see administration instructions. Take 650 three times a day and take 650 daily prn   ? acetaminophen (TYLENOL) 500 MG tablet Take 1,000 mg by mouth 4 (four) times a day.   ? alendronate (FOSAMAX) 70 MG tablet Take 70 mg by mouth once a week. Saturdays   ? cholecalciferol, vitamin D3, 1,000 unit (25 mcg) tablet Take 2,000 Units by mouth daily.   ? gabapentin (NEURONTIN) 100 MG capsule Take 100 mg by mouth 3 (three) times a day.   ? lidocaine (XYLOCAINE) 5 % ointment Apply to mid back tid   ? mirtazapine (REMERON) 7.5 MG tablet Take 7.5 mg by mouth at bedtime.   ? multivitamin therapeutic tablet Take 1 tablet by mouth daily.   ? polyethylene glycol (MIRALAX) 17 gram packet Take 1 packet (17 g total) by mouth daily.   ? senna-docusate (PERICOLACE) 8.6-50 mg tablet Take 1 tablet by mouth 2 (two) times a day.   ? traZODone (DESYREL) 50 MG tablet Take 0.5 tablets (25 mg total) by mouth at bedtime.   ? aluminum-magnesium hydroxide-simethicone (MAALOX ADVANCED) 200-200-20 mg/5 mL Susp Take 30 mL by mouth every 4 (four) hours as needed.   :        Cefdinir and Sulfa (sulfonamide antibiotics)    Vitals: Blood pressure 92/74 respirations 20 temperature 97.8 heart rate 86 O2 sats 91% weight is 83 pounds and stable  Physical exam: If performed was purely observational/via visual confirmation    Patient is alert sitting up at 45 degrees in bed eating lunch  with the help of staff.  She can hold her own glass and drink fluids though with some spillage.  Otherwise staff is managing situation.  Patient is able to ask simple questions for example requests tissues for her nose.  She has purposeful movement of all 4 extremities.  She has clear sclera with conjugate gaze.  She is inattentive to conversation.  She is not edematous.  Skin is intact in visualized areas.    Due to the 2020 Covid 19 pandemic, the patient was visually observed at a 6 foot plus distance.  An observational exam was performed in an effort to keep patient safe from Covid 19 and other communicable diseases.      Labs:  Lab Results   Component Value Date    WBC 7.3 02/06/2020    HGB 11.4 (L) 02/06/2020    HCT 35.3 02/06/2020    MCV 99 02/06/2020     02/06/2020     Results for orders placed or performed in visit on 02/06/20   Basic Metabolic Panel   Result Value Ref Range    Sodium 138 136 - 145 mmol/L    Potassium 3.9 3.5 - 5.0 mmol/L    Chloride 100 98 - 107 mmol/L    CO2 33 (H) 22 - 31 mmol/L    Anion Gap, Calculation 5 5 - 18 mmol/L    Glucose 82 70 - 125 mg/dL    Calcium 8.9 8.5 - 10.5 mg/dL    BUN 11 8 - 28 mg/dL    Creatinine 0.46 (L) 0.60 - 1.10 mg/dL    GFR MDRD Af Amer >60 >60 mL/min/1.73m2    GFR MDRD Non Af Amer >60 >60 mL/min/1.73m2         Lab Results   Component Value Date    TSH 1.53 12/05/2016     @LASTA!C@  [unfilled]  No results found for: [id number]  No results found for: BNP  [unfilled]        Invalid input(s): PRINTERVAL      Assessment/Plan:      ICD-10-CM    1. Compression fracture of body of thoracic vertebra (H)  S22.000A        Osteoporosis  New T6 compression fracture January 2020  Multiple old compression fractures as noted above  Vitamin D deficiency   Patient's pain has improved greatly.  She is no longer needing opiates and staff says she complains rarely of any discomfort.  They say she moves without apparent discomfort.  Patient continues on Fosamax (and remains  ambulatory), multivitamin, calcium, vitamin D replacement.  The acute pain seems to have improved greatly.  Recall that she never really did tolerate the TLSO.  She has acetaminophen and lidocaine and takes gabapentin 100 3 times daily.  Falls precautions are appropriate.      I am not sure were going to continue blood work in the long-term but I did order BMP CBC TSH today    Cachexia   She has stabilized with her weight being on mirtazapine 7.5 daily which is continued.  She eats little but likes her supplements which are keeping her going.  Comfort care focus remains primary goal here    .    Alzheimer's dementia   No behavioral disturbances.  Severe disease without medication therapy    Constipation     Stable on bowel medications no changes            Case discussed with:    Facility staff       Joshua Ott MD

## 2021-06-21 NOTE — LETTER
Letter by Dalton Todd CNP at      Author: Dalton Todd CNP Service: -- Author Type: --    Filed:  Encounter Date: 2021 Status: (Other)         [facility] - [address] [address]                                  2021    Patient: Ade Bob   MR Number: [id number]   YOB: 1927   Date of Visit: 2021     Dear Dr. Home:    Thank you for referring Ade Bob to me for evaluation. Below are the relevant portions of my assessment and plan of care.    If you have questions, please do not hesitate to call me. I look forward to following Ade along with you.    Sincerely,        Dalton Todd CNP          CC  No Recipients  Dalton Todd CNP  2021 11:31 PM  Sign when Signing Visit    [facility]      NAME:  Ade Bob             :  10/23/1927    MRN: [id number]    CODE STATUS:  DNR    FACILITY: [facility] [[id number]]         CHIEF COMPLAIN/REASON FOR VISIT:  Chief Complaint   Patient presents with   ? FVP Care Coordination - Regulatory       HISTORY OF PRESENT ILLNESS: Ade Bob is a 89 y.o. female being seen today for Regulatory visit for [facility] requirement. She lives in a memory care unit in a LTC facility.     Frail, elderly 89-year-old female with dementia.  Malnutrition with current weight now at 87 pounds which is an improvement from the last regulatory visit at 81 pounds.  She is using lidocaine patch for pain and denies any distress or discomfort today.  She does not respond to any review of systems but will appears comfortable and seems to enjoy in the presence.  Staff denies any concerns.    Frail elderly with dementia, malnutrition with current weight at 81 pounds.  She has had several spine fractures and upper back pain which she uses a lidocaine patch for.  Today she is lying comfortably does not  "appear to be in any distress or pain.  She is smiling and pleasant but does not respond to ROS questions.  Staff report no behavior issues.  She is often up to eat however she quickly wants to go back to bed.  She has no open areas or skin breakdown that are we are aware of.      Allergies   Allergen Reactions   ? Cefdinir Nausea Only   ? Sulfa (Sulfonamide Antibiotics) Unknown and Hives   :     Current Outpatient Medications   Medication Sig   ? acetaminophen (TYLENOL) 325 MG tablet Take 650 mg by mouth 3 (three) times a day.    ? alendronate (FOSAMAX) 70 MG tablet Take 70 mg by mouth once a week. Saturdays   ? cholecalciferol, vitamin D3, 1,000 unit (25 mcg) tablet Take 2,000 Units by mouth daily.   ? gabapentin (NEURONTIN) 100 MG capsule Take 100 mg by mouth 3 (three) times a day.   ? lidocaine (LIDODERM) 5 % Place 1 patch on the skin daily. Remove & Discard patch within 12 hours or as directed by MD. Apply to mid back   ? mirtazapine (REMERON) 7.5 MG tablet Take 7.5 mg by mouth at bedtime.   ? multivitamin therapeutic tablet Take 1 tablet by mouth daily.   ? polyethylene glycol (MIRALAX) 17 gram packet Take 1 packet (17 g total) by mouth daily.   ? senna-docusate (PERICOLACE) 8.6-50 mg tablet Take 1 tablet by mouth 2 (two) times a day.   ? traZODone (DESYREL) 50 MG tablet Take 0.5 tablets (25 mg total) by mouth at bedtime.         REVIEW OF SYSTEMS:    Due to advanced Alzheimer's unable to participate in ROS.      PHYSICAL EXAMINATION:  Vitals:    01/21/21 1021   BP: 110/81   Pulse: 85   Resp: 20   Temp: 97.3  F (36.3  C)   SpO2: 91%   Weight: (!) 87 lb (39.5 kg)   Height: 5' 4\" (1.626 m)         GENERAL: Awake, Alert, oriented TO SELF, not in any form of acute distress, unable to answers questions appropriately,commands, conversant  HEENT: Head is normocephalic with normal hair distribution. No evidence of trauma. Ears: No acute purulent discharge. Eyes: Conjunctivae pink with no scleral jaundice. Nose: Normal " mucosa and septum. NECK: Supple with no cervical or supraclavicular lymphadenopathy. Trachea is midline. Poor dental , broken teeth but clean oral cavity  CHEST: No tenderness or deformity, no crepitus  LUNG: Clear to auscultation with good chest expansion. There are no crackles or wheezes, normal AP diameter.  BACK: No kyphosis of the thoracic spine. Symmetric, no curvature, ROM normal, no CVA tenderness, no spinal tenderness   CVS: There is good S1  S2, rhythm is regular.  ABDOMEN: Globular and soft, nontender to palpation, non distended, no masses, no organomegaly, good bowel sounds, no rebound or guarding, no peritoneal signs.   EXTREMITIES: Atraumatic. Full range of motion on both upper and lower extremities, dependant on staff for mobility and uses wc when OOB  SKIN: Warm and dry, no erythema noted, no rashes or lesions.  NEUROLOGICAL: The patient is oriented to person, ADVANCED DEMENTIA        LABS:    Lab Results   Component Value Date    WBC 7.3 02/06/2020    HGB 11.4 (L) 02/06/2020    HCT 35.3 02/06/2020    MCV 99 02/06/2020     02/06/2020       Results for orders placed or performed in visit on 02/06/20   Basic Metabolic Panel   Result Value Ref Range    Sodium 138 136 - 145 mmol/L    Potassium 3.9 3.5 - 5.0 mmol/L    Chloride 100 98 - 107 mmol/L    CO2 33 (H) 22 - 31 mmol/L    Anion Gap, Calculation 5 5 - 18 mmol/L    Glucose 82 70 - 125 mg/dL    Calcium 8.9 8.5 - 10.5 mg/dL    BUN 11 8 - 28 mg/dL    Creatinine 0.46 (L) 0.60 - 1.10 mg/dL    GFR MDRD Af Amer >60 >60 mL/min/1.73m2    GFR MDRD Non Af Amer >60 >60 mL/min/1.73m2           No results found for: HGBA1C  Vitamin D, Total (25-Hydroxy)   Date Value Ref Range Status   02/04/2020 43.2 30.0 - 80.0 ng/mL Final     No results found for: [id number]    ASSESSMENT/PLAN:  1. Alzheimer's dementia without behavioral disturbance, unspecified timing of dementia onset (H)    2. Thoracic compression fracture, closed, initial encounter (H)    3. Severe  protein-calorie malnutrition (H)    4. Acute midline thoracic back pain      Dementia: advanced, severe. Minimal conversation but does smile and appreciate my visit.     Severe protein calorie malnutrition: Weight improved now 87 pounds.  Up from 81 pounds.    -She remains on Remeron.   -Multivitamin daily.    Back pain: continues lidocaine patch and tylenol.  Has been started on Fosamax.  -Gabapentin 100 mg p.o. daily.  -Fosamax every week at Saturday.    Case Management:     I have reviewed the facility care plan/MDS which was done quarterly.  including the falls risk, nutrition and pain screening. I also reviewed the current immunizations, and preventive care.. Future cancer screening is not clinically indicated secondary to age/goals of care.   Patient's desire to return to the community is not assessible due to her desire to Alzheimer's advancement  Advance Directive : DNR, per staff and medical records, pt cannot participate in discussion  I reviewed the current advanced directives as reflected at  the facility chart.   .  Team Discussion:  I communicated with the appropriate disciplines involved with the Plan of Care:   Nursing       Patient Goal:  Patient's goal is comfort focus and DNR     Information reviewed:  Medications, vital signs, orders, and nursing notes.         Electronically signed by:  Dalton Todd CNP  This progress note was completed using Dragon software and there may be grammatical errors.

## 2021-06-21 NOTE — LETTER
Letter by Joshua Ott MD at      Author: Joshua Ott MD Service: -- Author Type: --    Filed:  Encounter Date: 2/26/2021 Status: (Other)         [facility] - [address] [address]                                  March 2, 2021    Patient: Ade oBb   MR Number: [id number]   YOB: 1927   Date of Visit: 2/26/2021     Dear  Home:    Thank you for referring Ade Bob to me for evaluation. Below are the relevant portions of my assessment and plan of care.    If you have questions, please do not hesitate to call me. I look forward to following Ade along with you.    Sincerely,        Joshua Ott MD          CC  No Recipients  Joshua Ott MD  3/2/2021  7:25 PM  Signed           [facility]    Facility:  [facility] [[id number]]    Code Status:  DNR    Chief Complaint   Patient presents with   ? Problem Visit     diarrhea    :                                     Patient Active Problem List   Diagnosis   ? Back pain   ? Compression fracture of body of thoracic vertebra (H)   ? Cachexia (H)   ? Malnutrition (H)   ? Severe protein-calorie malnutrition (H)   ? Hyponatremia   ? Thoracic compression fracture, closed, initial encounter (H)   ? Alzheimer's dementia (H)   ? Advanced directives, counseling/discussion   ? Closed stable burst fracture of T6 vertebra (H)   ? Closed stable burst fracture of T8 vertebra (H)   ? Metabolic encephalopathy   ? Diarrhea           Subjective/review of systems/history:  Ade Bob  is a 89 year old female with history of Alzheimer's dementia, osteoporosis with multiple spinal fractures, hyperlipidemia, malnutrition, vitamin D deficiency          Most recent hospital admission: January 28 through January 31 for acute on chronic back pain.  Work-up revealed new T6 compression fracture  "age-indeterminate T8 fracture and old T10 T12 L3-L4 fractures.  Patient was unable to tolerate TLSO.        Subjective/review of systems:    -In visiting the unit today, staff informs me that the patient started to have diarrhea yesterday afternoon.  She has had 7 loose stools in the last 24 hours.  She has had decreased appetite but has been able to take fluids.  She has not had fever sweats or chills.  There have been no respiratory symptoms.  They did send a rapid Covid test which came back negative.  There are no Covid cases and the staff or patients on the unit.  There is no 1 else on the unit with viral gastroenteritis symptoms.    Patient has been able to take her medications without vomiting.    Patient is unable to contribute much to the history.  She just knows \"I do not feel good today\".  She seems uncertain but reports no belly pain flank pain dysuria.  Staff has noted no hematuria.  There is been no melena or bright red blood.  She has had no vomiting.  No known falls or injuries.  Remainder negative or unobtainable      Past Medical History:   Diagnosis Date   ? Alzheimer's dementia (H)     per family report   ? Back pain 2017   ? Dementia (H)    ? Hyperlipidemia      Past Surgical History:   Procedure Laterality Date   ? NO PAST SURGERIES            No family history on file.:   Unobtainable, patient unaware    Social History     Socioeconomic History   ? Marital status:      Spouse name: Not on file   ? Number of children: Not on file   ? Years of education: Not on file   ? Highest education level: Not on file   Occupational History   ? Not on file   Social Needs   ? Financial resource strain: Not on file   ? Food insecurity     Worry: Not on file     Inability: Not on file   ? Transportation needs     Medical: Not on file     Non-medical: Not on file   Tobacco Use   ? Smoking status: Former Smoker     Packs/day: 1.00     Quit date: 1977     Years since quittin.1   ? Smokeless " tobacco: Never Used   ? Tobacco comment: Quit smoking many years ago   Substance and Sexual Activity   ? Alcohol use: No   ? Drug use: No   ? Sexual activity: Not Currently   Lifestyle   ? Physical activity     Days per week: Not on file     Minutes per session: Not on file   ? Stress: Not on file   Relationships   ? Social connections     Talks on phone: Not on file     Gets together: Not on file     Attends Restorationism service: Not on file     Active member of club or organization: Not on file     Attends meetings of clubs or organizations: Not on file     Relationship status: Not on file   ? Intimate partner violence     Fear of current or ex partner: Not on file     Emotionally abused: Not on file     Physically abused: Not on file     Forced sexual activity: Not on file   Other Topics Concern   ? Not on file   Social History Narrative   ? Not on file   :        Current Outpatient Medications   Medication Sig   ? acetaminophen (TYLENOL) 325 MG tablet Take 650 mg by mouth 3 (three) times a day.    ? alendronate (FOSAMAX) 70 MG tablet Take 70 mg by mouth once a week. Saturdays   ? cholecalciferol, vitamin D3, 1,000 unit (25 mcg) tablet Take 2,000 Units by mouth daily.   ? gabapentin (NEURONTIN) 100 MG capsule Take 100 mg by mouth 3 (three) times a day.   ? lidocaine (LIDODERM) 5 % Place 1 patch on the skin daily. Remove & Discard patch within 12 hours or as directed by MD. Apply to mid back   ? mirtazapine (REMERON) 7.5 MG tablet Take 7.5 mg by mouth at bedtime.   ? multivitamin therapeutic tablet Take 1 tablet by mouth daily.   ? polyethylene glycol (MIRALAX) 17 gram packet Take 1 packet (17 g total) by mouth daily.   ? senna-docusate (PERICOLACE) 8.6-50 mg tablet Take 1 tablet by mouth 2 (two) times a day.   ? traZODone (DESYREL) 50 MG tablet Take 0.5 tablets (25 mg total) by mouth at bedtime.   :        Cefdinir and Sulfa (sulfonamide antibiotics)    Vitals:   Vitals:    02/26/21 1626   BP: 110/81   Pulse: 83    Resp: 20   Temp: 98  F (36.7  C)   SpO2: 90%     Weight is stable at 88 pounds  Physical exam:     Patient is lying in bed sleeping.  I wake her.  She alerts and is inattentive, looks tired.  She is quite thin but her usual self.  Normocephalic atraumatic sclera clear gaze is conjugate speech is clear she is breathing comfortably without accessory muscle use or tachypnea her lungs are clear to auscultation.  Pulses are strong with good perfusion throughout with rates near 80.  Her belly does have normal bowel sounds and is soft.  No involuntary guarding.  No organomegaly or mass noted.  No CVA tenderness.  Extremities without edema.  Skin intact in visualized areas         Due to the 2020 Covid 19 pandemic, the patient was visually observed at a 6 foot plus distance.  An observational exam was performed in an effort to keep patient safe from Covid 19 and other communicable diseases.      Labs:  Lab Results   Component Value Date    WBC 7.3 02/06/2020    HGB 11.4 (L) 02/06/2020    HCT 35.3 02/06/2020    MCV 99 02/06/2020     02/06/2020     Results for orders placed or performed in visit on 02/06/20   Basic Metabolic Panel   Result Value Ref Range    Sodium 138 136 - 145 mmol/L    Potassium 3.9 3.5 - 5.0 mmol/L    Chloride 100 98 - 107 mmol/L    CO2 33 (H) 22 - 31 mmol/L    Anion Gap, Calculation 5 5 - 18 mmol/L    Glucose 82 70 - 125 mg/dL    Calcium 8.9 8.5 - 10.5 mg/dL    BUN 11 8 - 28 mg/dL    Creatinine 0.46 (L) 0.60 - 1.10 mg/dL    GFR MDRD Af Amer >60 >60 mL/min/1.73m2    GFR MDRD Non Af Amer >60 >60 mL/min/1.73m2         Lab Results   Component Value Date    TSH 1.53 12/05/2016     @LASTA!C@  [unfilled]  No results found for: XHHZWFFH17  No results found for: BNP  [unfilled]        Invalid input(s): PRINTERVAL      Assessment/Plan:      ICD-10-CM    1. Acute midline thoracic back pain  M54.6    2. Compression fracture of body of thoracic vertebra (H)  S22.000A    3. Diarrhea, unspecified type   "R19.7        Loose stool    Patient has had 7 loose stools in 24 hours without any vomiting.  She has had decreased appetite but has been taking liquids.  No apparent infectious issues on the dementia unit at this time.    Wide differential with infectious etiologies always near the top of the list.  However I think it is too early to launch into a broad work-up, especially since she lacks any \"warning signs\" such as vomiting/dehydration, fever belly tenderness melena.  However that decision could easily change if her loose stools continue, or become accompanied by other symptoms such as vomiting fever belly pain.  Recall also that patient has a comfort care goal, and if worsens we will need to discuss options with her family members.  I discussed this with nurse manager of the unit.  -Push fluids  -As long as she is not vomiting I think is okay to let her eat what she wants to  -She is not complaining of nausea now but could add Zofran if need be  -If patient worsens she will need reevaluation      osteoporosis  New T6 compression fracture January 2020  Multiple old compression fractures as noted above  Vitamin D deficiency   Patient has stabilized from this standpoint.   She is no longer needing opiates and staff says she complains rarely of any discomfort. Patient continues on Fosamax (and remains ambulatory), multivitamin, calcium, vitamin D replacement.   Recall that she never really did tolerate the TLSO.  She has acetaminophen and lidocaine and takes gabapentin 100 3 times daily.  Falls precautions are appropriate.      Cachexia   She has stabilized with her weight being on mirtazapine 7.5 daily which is continued.  She eats little but likes her supplements which are keeping her going.  Comfort care focus remains primary goal here    .    Alzheimer's dementia   No behavioral disturbances.  Severe disease without medication therapy    Constipation     Bowel medications are on hold of course, discussed with " nurse manager.  She usually takes senna docusate and MiraLAX on a scheduled basis            Case discussed with:    Facility staff       Joshua Ott MD

## 2021-06-21 NOTE — LETTER
Letter by Dalton Todd CNP at      Author: Dalton Todd CNP Service: -- Author Type: --    Filed:  Encounter Date: 3/9/2021 Status: (Other)         [facility] - [address] [address]                                  March 10, 2021    Patient: Ade Bob   MR Number: [id number]   YOB: 1927   Date of Visit: 3/9/2021     Dear Dr. Home:    Thank you for referring Ade Bob to me for evaluation. Below are the relevant portions of my assessment and plan of care.    If you have questions, please do not hesitate to call me. I look forward to following Ade along with you.    Sincerely,        Dalton Todd CNP          CC  No Recipients  Dalton Todd CNP  3/10/2021  9:00 PM  Sign when Signing Visit    [facility]      NAME:  Ade Bob             :  10/23/1927    MRN: [id number]    CODE STATUS:  DNR    FACILITY: [facility] [[id number]]         CHIEF COMPLAIN/REASON FOR VISIT:  Chief Complaint   Patient presents with   ? Problem Visit     Sharp decline in status, hospice initiated.       HISTORY OF PRESENT ILLNESS: Ade Bob is a 89 y.o. female being seen today for Regulatory visit for [facility]. She lives in a memory care unit in a LTC facility.     Frail, elderly 89-year-old female with dementia.  Malnutrition with current weight now at 87 pounds which is an improvement from the last regulatory visit at 81 pounds.  She is using lidocaine patch for pain and denies any distress or discomfort today.  She does not respond to any review of systems but will appears comfortable and seems to enjoy in the presence.  Staff denies any concerns.    Today: Patient has been signed on with [facility] after decreased  responsiveness yesterday. She is seen resting in bed and appears comfortable. Family  members are at bedside, and they  tell me that additional family members are arriving  soon. She is minimally responsive on exam. Family report she was somewhat agitated  earlier, removing oxygen tubing. Per the advise of her Hospice caregivers, this was  removed for comfort and patient has been resting peacefully since. Nursing report  morphine and ativan have been effective for relief of symptoms. She is unable to  participate in ROS but family are satisfied with her comfort and graciously note that they  are grateful for care she has received and to be present today despite the usual  pandemic visiting restrictions.      Allergies   Allergen Reactions   ? Cefdinir Nausea Only   ? Sulfa (Sulfonamide Antibiotics) Unknown and Hives   :     Current Outpatient Medications   Medication Sig   ? acetaminophen (TYLENOL) 325 MG tablet Take 650 mg by mouth 3 (three) times a day.    ? alendronate (FOSAMAX) 70 MG tablet Take 70 mg by mouth once a week. Saturdays   ? cholecalciferol, vitamin D3, 1,000 unit (25 mcg) tablet Take 2,000 Units by mouth daily.   ? gabapentin (NEURONTIN) 100 MG capsule Take 100 mg by mouth 3 (three) times a day.   ? lidocaine (LIDODERM) 5 % Place 1 patch on the skin daily. Remove & Discard patch within 12 hours or as directed by MD. Apply to mid back   ? mirtazapine (REMERON) 7.5 MG tablet Take 7.5 mg by mouth at bedtime.   ? morphine 2.5 MG solutab Place 2.5 mg under the tongue every 2 (two) hours as needed for pain (SOB/pain).   ? multivitamin therapeutic tablet Take 1 tablet by mouth daily.   ? polyethylene glycol (MIRALAX) 17 gram packet Take 1 packet (17 g total) by mouth daily.   ? senna-docusate (PERICOLACE) 8.6-50 mg tablet Take 1 tablet by mouth 2 (two) times a day.   ? traZODone (DESYREL) 50 MG tablet Take 0.5 tablets (25 mg total) by mouth at bedtime.         REVIEW OF SYSTEMS:    Due to advanced Alzheimer's unable to participate in ROS.      PHYSICAL EXAMINATION:  Vitals:    03/09/21 1137   BP: (!) 64/42   Pulse: (!) 109    Resp: 18   Temp: 99  F (37.2  C)   SpO2: 90%   Weight: (!) 87 lb 12.8 oz (39.8 kg)         GENERAL: Awake, Alert, oriented TO SELF, not in any form of acute distress, unable to answers questions appropriately,commands, conversant  HEENT: Head is normocephalic with normal hair distribution. No evidence of trauma. Ears: No acute purulent discharge. Eyes: Conjunctivae pink with no scleral jaundice. Nose: Normal mucosa and septum. NECK: Supple with no cervical or supraclavicular lymphadenopathy. Trachea is midline. Poor dental , broken teeth but clean oral cavity  CHEST: No tenderness or deformity, no crepitus  LUNG: Clear to auscultation with good chest expansion. There are no crackles or wheezes, normal AP diameter.  BACK: No kyphosis of the thoracic spine. Symmetric, no curvature, ROM normal, no CVA tenderness, no spinal tenderness   CVS: There is good S1  S2, rhythm is regular.  ABDOMEN: Globular and soft, nontender to palpation, non distended, no masses, no organomegaly, good bowel sounds, no rebound or guarding, no peritoneal signs.   EXTREMITIES: Atraumatic. Full range of motion on both upper and lower extremities, dependant on staff for mobility and uses wc when OOB  SKIN: Warm and dry, no erythema noted, no rashes or lesions.  NEUROLOGICAL: The patient is oriented to person, ADVANCED DEMENTIA        LABS:    Lab Results   Component Value Date    WBC 7.3 02/06/2020    HGB 11.4 (L) 02/06/2020    HCT 35.3 02/06/2020    MCV 99 02/06/2020     02/06/2020       Results for orders placed or performed in visit on 02/06/20   Basic Metabolic Panel   Result Value Ref Range    Sodium 138 136 - 145 mmol/L    Potassium 3.9 3.5 - 5.0 mmol/L    Chloride 100 98 - 107 mmol/L    CO2 33 (H) 22 - 31 mmol/L    Anion Gap, Calculation 5 5 - 18 mmol/L    Glucose 82 70 - 125 mg/dL    Calcium 8.9 8.5 - 10.5 mg/dL    BUN 11 8 - 28 mg/dL    Creatinine 0.46 (L) 0.60 - 1.10 mg/dL    GFR MDRD Af Amer >60 >60 mL/min/1.73m2    GFR MDRD  Non Af Amer >60 >60 mL/min/1.73m2           No results found for: HGBA1C  Vitamin D, Total (25-Hydroxy)   Date Value Ref Range Status   02/04/2020 43.2 30.0 - 80.0 ng/mL Final     No results found for: [id number]    ASSESSMENT/PLAN:  1. Metabolic encephalopathy    2. Alzheimer's dementia without behavioral disturbance, unspecified timing of dementia onset (H)    3. Severe protein-calorie malnutrition (H)      Dementia:  Palliative Care  Anxiety  Pain  Progression to end-stage dementia, Hospice through [facility]. Appears comfortable,  confirmed by family and nursing reports. Medications have been switched to comfort  focused and in liquid/dissolvable froms. Morphine and Ativan available PRN, continue  oxygen as needed for comfort only.  -Continue plan of care  -Hospice management of pain and symptoms per their discretion      Electronically signed by:  Dalton Todd CNP  This progress note was completed using Dragon software and there may be grammatical errors.